# Patient Record
Sex: FEMALE | Race: WHITE | Employment: PART TIME | URBAN - METROPOLITAN AREA
[De-identification: names, ages, dates, MRNs, and addresses within clinical notes are randomized per-mention and may not be internally consistent; named-entity substitution may affect disease eponyms.]

---

## 2018-07-18 PROBLEM — R10.2 PELVIC PAIN: Status: ACTIVE | Noted: 2018-07-18

## 2018-07-18 PROBLEM — R92.2 DENSE BREASTS: Status: ACTIVE | Noted: 2017-11-06

## 2018-07-18 PROBLEM — R19.00 PELVIC MASS: Status: ACTIVE | Noted: 2018-07-18

## 2018-07-18 PROBLEM — R92.30 DENSE BREASTS: Status: ACTIVE | Noted: 2017-11-06

## 2018-07-20 PROBLEM — D21.9 LEIOMYOMA: Status: ACTIVE | Noted: 2018-07-20

## 2018-07-20 PROBLEM — R10.2 PELVIC PAIN: Status: RESOLVED | Noted: 2018-07-18 | Resolved: 2018-07-20

## 2018-08-07 PROCEDURE — 88305 TISSUE EXAM BY PATHOLOGIST: CPT | Performed by: OBSTETRICS & GYNECOLOGY

## 2018-08-16 PROCEDURE — 83625 ASSAY OF LDH ENZYMES: CPT | Performed by: OBSTETRICS & GYNECOLOGY

## 2018-09-14 PROBLEM — Z90.710 S/P LAPAROSCOPIC HYSTERECTOMY: Status: ACTIVE | Noted: 2018-09-14

## 2018-09-14 PROBLEM — D21.9 LEIOMYOMA: Status: RESOLVED | Noted: 2018-07-20 | Resolved: 2018-09-14

## 2018-09-14 PROBLEM — R19.00 PELVIC MASS: Status: RESOLVED | Noted: 2018-07-18 | Resolved: 2018-09-14

## 2018-12-12 PROCEDURE — 88304 TISSUE EXAM BY PATHOLOGIST: CPT | Performed by: OBSTETRICS & GYNECOLOGY

## 2021-02-02 ENCOUNTER — OFFICE VISIT (OUTPATIENT)
Dept: FAMILY MEDICINE CLINIC | Facility: CLINIC | Age: 56
End: 2021-02-02
Payer: COMMERCIAL

## 2021-02-02 VITALS
BODY MASS INDEX: 24.46 KG/M2 | TEMPERATURE: 98 F | WEIGHT: 152.19 LBS | DIASTOLIC BLOOD PRESSURE: 70 MMHG | RESPIRATION RATE: 16 BRPM | HEIGHT: 66 IN | HEART RATE: 68 BPM | SYSTOLIC BLOOD PRESSURE: 116 MMHG

## 2021-02-02 DIAGNOSIS — G43.709 CHRONIC MIGRAINE WITHOUT AURA WITHOUT STATUS MIGRAINOSUS, NOT INTRACTABLE: ICD-10-CM

## 2021-02-02 DIAGNOSIS — Z12.31 VISIT FOR SCREENING MAMMOGRAM: ICD-10-CM

## 2021-02-02 DIAGNOSIS — Z00.00 ROUTINE GENERAL MEDICAL EXAMINATION AT A HEALTH CARE FACILITY: Primary | ICD-10-CM

## 2021-02-02 PROCEDURE — 3008F BODY MASS INDEX DOCD: CPT | Performed by: FAMILY MEDICINE

## 2021-02-02 PROCEDURE — 3078F DIAST BP <80 MM HG: CPT | Performed by: FAMILY MEDICINE

## 2021-02-02 PROCEDURE — 3074F SYST BP LT 130 MM HG: CPT | Performed by: FAMILY MEDICINE

## 2021-02-02 PROCEDURE — 99386 PREV VISIT NEW AGE 40-64: CPT | Performed by: FAMILY MEDICINE

## 2021-02-02 RX ORDER — SUMATRIPTAN 100 MG/1
TABLET, FILM COATED ORAL
Qty: 27 TABLET | Refills: 3 | Status: SHIPPED | OUTPATIENT
Start: 2021-02-02

## 2021-02-02 NOTE — PROGRESS NOTES
HPI:   Afsaneh Holder is a 63year old female who presents for a complete physical exam.  Last pap:  S/p hyst age 46, preserved ovaries  Last mammogram:  due   Previous colonoscopy:  Age 53/51 in 2015  Previous DEXA:  /.  Current or previous treatment:  /  F Date   • HYSTERECTOMY     •       x2      Family History   Problem Relation Age of Onset   • Breast Cancer Mother 80   • High Blood Pressure Mother    • Cancer Mother         pancreatic   • Cancer Father         esophageal    • High Cholesterol Father Imitrex refilled.    Orders Placed This Encounter      Lipid Panel      Comp Metabolic Panel (14)      CBC, Platelet, No Differential [E]      TSH W Reflex To Free T4      Vitamin D, 25-Hydroxy      Meds & Refills for this Visit:  Requested Prescriptions

## 2021-03-02 ENCOUNTER — LAB ENCOUNTER (OUTPATIENT)
Dept: LAB | Age: 56
End: 2021-03-02
Attending: FAMILY MEDICINE
Payer: COMMERCIAL

## 2021-03-02 DIAGNOSIS — Z00.00 ROUTINE GENERAL MEDICAL EXAMINATION AT A HEALTH CARE FACILITY: ICD-10-CM

## 2021-03-02 LAB
ALBUMIN SERPL-MCNC: 3.8 G/DL (ref 3.4–5)
ALBUMIN/GLOB SERPL: 1.3 {RATIO} (ref 1–2)
ALP LIVER SERPL-CCNC: 35 U/L
ALT SERPL-CCNC: 38 U/L
ANION GAP SERPL CALC-SCNC: 5 MMOL/L (ref 0–18)
AST SERPL-CCNC: 17 U/L (ref 15–37)
BILIRUB SERPL-MCNC: 0.6 MG/DL (ref 0.1–2)
BUN BLD-MCNC: 16 MG/DL (ref 7–18)
BUN/CREAT SERPL: 16.3 (ref 10–20)
CALCIUM BLD-MCNC: 9.1 MG/DL (ref 8.5–10.1)
CHLORIDE SERPL-SCNC: 108 MMOL/L (ref 98–112)
CHOLEST SMN-MCNC: 261 MG/DL (ref ?–200)
CO2 SERPL-SCNC: 29 MMOL/L (ref 21–32)
CREAT BLD-MCNC: 0.98 MG/DL
DEPRECATED RDW RBC AUTO: 45.1 FL (ref 35.1–46.3)
ERYTHROCYTE [DISTWIDTH] IN BLOOD BY AUTOMATED COUNT: 13.4 % (ref 11–15)
GLOBULIN PLAS-MCNC: 3 G/DL (ref 2.8–4.4)
GLUCOSE BLD-MCNC: 91 MG/DL (ref 70–99)
HCT VFR BLD AUTO: 36.6 %
HDLC SERPL-MCNC: 89 MG/DL (ref 40–59)
HGB BLD-MCNC: 11.9 G/DL
LDLC SERPL CALC-MCNC: 155 MG/DL (ref ?–100)
M PROTEIN MFR SERPL ELPH: 6.8 G/DL (ref 6.4–8.2)
MCH RBC QN AUTO: 29.7 PG (ref 26–34)
MCHC RBC AUTO-ENTMCNC: 32.5 G/DL (ref 31–37)
MCV RBC AUTO: 91.3 FL
NONHDLC SERPL-MCNC: 172 MG/DL (ref ?–130)
OSMOLALITY SERPL CALC.SUM OF ELEC: 295 MOSM/KG (ref 275–295)
PATIENT FASTING Y/N/NP: YES
PATIENT FASTING Y/N/NP: YES
PLATELET # BLD AUTO: 165 10(3)UL (ref 150–450)
POTASSIUM SERPL-SCNC: 4.1 MMOL/L (ref 3.5–5.1)
RBC # BLD AUTO: 4.01 X10(6)UL
SODIUM SERPL-SCNC: 142 MMOL/L (ref 136–145)
TRIGL SERPL-MCNC: 83 MG/DL (ref 30–149)
TSI SER-ACNC: 2.86 MIU/ML (ref 0.36–3.74)
VIT D+METAB SERPL-MCNC: 70.5 NG/ML (ref 30–100)
VLDLC SERPL CALC-MCNC: 17 MG/DL (ref 0–30)
WBC # BLD AUTO: 4.4 X10(3) UL (ref 4–11)

## 2021-03-02 PROCEDURE — 84443 ASSAY THYROID STIM HORMONE: CPT

## 2021-03-02 PROCEDURE — 85027 COMPLETE CBC AUTOMATED: CPT

## 2021-03-02 PROCEDURE — 80053 COMPREHEN METABOLIC PANEL: CPT

## 2021-03-02 PROCEDURE — 82306 VITAMIN D 25 HYDROXY: CPT

## 2021-03-02 PROCEDURE — 80061 LIPID PANEL: CPT

## 2021-03-02 PROCEDURE — 36415 COLL VENOUS BLD VENIPUNCTURE: CPT

## 2021-03-05 ENCOUNTER — TELEPHONE (OUTPATIENT)
Dept: FAMILY MEDICINE CLINIC | Facility: CLINIC | Age: 56
End: 2021-03-05

## 2021-03-05 DIAGNOSIS — D64.9 ANEMIA, UNSPECIFIED TYPE: Primary | ICD-10-CM

## 2021-03-16 ENCOUNTER — HOSPITAL ENCOUNTER (OUTPATIENT)
Dept: MAMMOGRAPHY | Facility: HOSPITAL | Age: 56
Discharge: HOME | End: 2021-03-16
Attending: FAMILY MEDICINE
Payer: COMMERCIAL

## 2021-03-16 DIAGNOSIS — Z12.31 VISIT FOR SCREENING MAMMOGRAM: ICD-10-CM

## 2021-03-16 PROCEDURE — 77067 SCR MAMMO BI INCL CAD: CPT | Performed by: FAMILY MEDICINE

## 2021-03-16 PROCEDURE — 77063 BREAST TOMOSYNTHESIS BI: CPT | Performed by: FAMILY MEDICINE

## 2021-03-24 ENCOUNTER — PATIENT MESSAGE (OUTPATIENT)
Dept: FAMILY MEDICINE CLINIC | Facility: CLINIC | Age: 56
End: 2021-03-24

## 2021-03-24 NOTE — TELEPHONE ENCOUNTER
From: Toma Baxter  To: Raven Martins MD  Sent: 3/24/2021 12:54 PM CDT  Subject: Non-Urgent Medical Question    I was directed by your Customer Service group to let you know that I have already received my Covid Vaccines.     I received two doses of Pfize

## 2021-04-14 ENCOUNTER — HOSPITAL ENCOUNTER (OUTPATIENT)
Dept: MAMMOGRAPHY | Facility: HOSPITAL | Age: 56
Discharge: HOME | End: 2021-04-14
Attending: FAMILY MEDICINE
Payer: COMMERCIAL

## 2021-04-14 ENCOUNTER — TELEPHONE (OUTPATIENT)
Dept: FAMILY MEDICINE CLINIC | Facility: CLINIC | Age: 56
End: 2021-04-14

## 2021-04-14 DIAGNOSIS — R92.2 DENSE BREASTS: ICD-10-CM

## 2021-04-14 PROCEDURE — 76641 ULTRASOUND BREAST COMPLETE: CPT | Performed by: FAMILY MEDICINE

## 2021-04-14 NOTE — TELEPHONE ENCOUNTER
calling in to ask if the following Dx code can be added to the current US Breast Bilateral order that is currently in place. Dx code 12.39.  is requesting this for billing purposes. Does not want it billed as dx procedure.   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

## 2021-06-15 ENCOUNTER — LAB ENCOUNTER (OUTPATIENT)
Dept: LAB | Age: 56
End: 2021-06-15
Attending: FAMILY MEDICINE
Payer: COMMERCIAL

## 2021-06-16 ENCOUNTER — LAB ENCOUNTER (OUTPATIENT)
Dept: LAB | Age: 56
End: 2021-06-16
Attending: FAMILY MEDICINE
Payer: COMMERCIAL

## 2021-06-16 DIAGNOSIS — D64.9 ANEMIA, UNSPECIFIED TYPE: ICD-10-CM

## 2021-06-16 DIAGNOSIS — E78.00 PURE HYPERCHOLESTEROLEMIA: ICD-10-CM

## 2021-06-16 PROCEDURE — 36415 COLL VENOUS BLD VENIPUNCTURE: CPT

## 2021-06-16 PROCEDURE — 83540 ASSAY OF IRON: CPT

## 2021-06-16 PROCEDURE — 85025 COMPLETE CBC W/AUTO DIFF WBC: CPT

## 2021-06-16 PROCEDURE — 82607 VITAMIN B-12: CPT

## 2021-06-16 PROCEDURE — 80061 LIPID PANEL: CPT

## 2021-06-16 PROCEDURE — 83550 IRON BINDING TEST: CPT

## 2021-06-16 PROCEDURE — 82728 ASSAY OF FERRITIN: CPT

## 2021-06-16 PROCEDURE — 82746 ASSAY OF FOLIC ACID SERUM: CPT

## 2021-06-23 ENCOUNTER — TELEPHONE (OUTPATIENT)
Dept: FAMILY MEDICINE CLINIC | Facility: CLINIC | Age: 56
End: 2021-06-23

## 2021-06-23 NOTE — TELEPHONE ENCOUNTER
_______________________________________________________  Referred to Provider Information:  Provider Address Phone   Kina Stewart, [address] [place] [address] Dr Abram Philip [phone]     [facility]

## 2022-01-31 ENCOUNTER — TELEPHONE (OUTPATIENT)
Dept: FAMILY MEDICINE CLINIC | Facility: CLINIC | Age: 57
End: 2022-01-31

## 2022-01-31 DIAGNOSIS — Z00.00 ROUTINE GENERAL MEDICAL EXAMINATION AT A HEALTH CARE FACILITY: Primary | ICD-10-CM

## 2022-01-31 NOTE — TELEPHONE ENCOUNTER
Please enter lab orders for the patient's upcoming physical appointment. Physical scheduled:    Your appointments     Date & Time Appointment Department [facility])    Mar 21, 2022 12:00 PM CDT Adult Physical with Delia Zuluaga  [facility],

## 2022-04-05 ENCOUNTER — LAB ENCOUNTER (OUTPATIENT)
Dept: LAB | Age: 57
End: 2022-04-05
Attending: FAMILY MEDICINE
Payer: COMMERCIAL

## 2022-04-05 DIAGNOSIS — Z00.00 ROUTINE GENERAL MEDICAL EXAMINATION AT A HEALTH CARE FACILITY: ICD-10-CM

## 2022-04-05 LAB
ALBUMIN SERPL-MCNC: 3.7 G/DL (ref 3.4–5)
ALBUMIN/GLOB SERPL: 1.2 {RATIO} (ref 1–2)
ALP LIVER SERPL-CCNC: 36 U/L
ALT SERPL-CCNC: 34 U/L
ANION GAP SERPL CALC-SCNC: 4 MMOL/L (ref 0–18)
AST SERPL-CCNC: 23 U/L (ref 15–37)
BASOPHILS # BLD AUTO: 0.05 X10(3) UL (ref 0–0.2)
BASOPHILS NFR BLD AUTO: 1.1 %
BILIRUB SERPL-MCNC: 0.6 MG/DL (ref 0.1–2)
BUN BLD-MCNC: 14 MG/DL (ref 7–18)
CALCIUM BLD-MCNC: 9 MG/DL (ref 8.5–10.1)
CHLORIDE SERPL-SCNC: 109 MMOL/L (ref 98–112)
CHOLEST SERPL-MCNC: 246 MG/DL (ref ?–200)
CO2 SERPL-SCNC: 28 MMOL/L (ref 21–32)
CREAT BLD-MCNC: 0.96 MG/DL
DEPRECATED HBV CORE AB SER IA-ACNC: 22.7 NG/ML
EOSINOPHIL # BLD AUTO: 0.14 X10(3) UL (ref 0–0.7)
EOSINOPHIL NFR BLD AUTO: 3 %
ERYTHROCYTE [DISTWIDTH] IN BLOOD BY AUTOMATED COUNT: 13.2 %
FASTING PATIENT LIPID ANSWER: YES
FASTING STATUS PATIENT QL REPORTED: YES
GLOBULIN PLAS-MCNC: 3.1 G/DL (ref 2.8–4.4)
GLUCOSE BLD-MCNC: 96 MG/DL (ref 70–99)
HCT VFR BLD AUTO: 36.7 %
HDLC SERPL-MCNC: 87 MG/DL (ref 40–59)
HGB BLD-MCNC: 11.9 G/DL
IMM GRANULOCYTES # BLD AUTO: 0 X10(3) UL (ref 0–1)
IMM GRANULOCYTES NFR BLD: 0 %
LDLC SERPL CALC-MCNC: 146 MG/DL (ref ?–100)
LYMPHOCYTES # BLD AUTO: 2.35 X10(3) UL (ref 1–4)
LYMPHOCYTES NFR BLD AUTO: 50.5 %
MCH RBC QN AUTO: 29.6 PG (ref 26–34)
MCHC RBC AUTO-ENTMCNC: 32.4 G/DL (ref 31–37)
MCV RBC AUTO: 91.3 FL
MONOCYTES # BLD AUTO: 0.37 X10(3) UL (ref 0.1–1)
MONOCYTES NFR BLD AUTO: 8 %
NEUTROPHILS # BLD AUTO: 1.74 X10 (3) UL (ref 1.5–7.7)
NEUTROPHILS # BLD AUTO: 1.74 X10(3) UL (ref 1.5–7.7)
NEUTROPHILS NFR BLD AUTO: 37.4 %
NONHDLC SERPL-MCNC: 159 MG/DL (ref ?–130)
OSMOLALITY SERPL CALC.SUM OF ELEC: 292 MOSM/KG (ref 275–295)
PLATELET # BLD AUTO: 175 10(3)UL (ref 150–450)
POTASSIUM SERPL-SCNC: 4.2 MMOL/L (ref 3.5–5.1)
PROT SERPL-MCNC: 6.8 G/DL (ref 6.4–8.2)
RBC # BLD AUTO: 4.02 X10(6)UL
SODIUM SERPL-SCNC: 141 MMOL/L (ref 136–145)
TRIGL SERPL-MCNC: 80 MG/DL (ref 30–149)
TSI SER-ACNC: 2.71 MIU/ML (ref 0.36–3.74)
VIT D+METAB SERPL-MCNC: 79.2 NG/ML (ref 30–100)
VLDLC SERPL CALC-MCNC: 15 MG/DL (ref 0–30)
WBC # BLD AUTO: 4.7 X10(3) UL (ref 4–11)

## 2022-04-05 PROCEDURE — 82728 ASSAY OF FERRITIN: CPT

## 2022-04-05 PROCEDURE — 85025 COMPLETE CBC W/AUTO DIFF WBC: CPT

## 2022-04-05 PROCEDURE — 80053 COMPREHEN METABOLIC PANEL: CPT

## 2022-04-05 PROCEDURE — 80061 LIPID PANEL: CPT

## 2022-04-05 PROCEDURE — 84443 ASSAY THYROID STIM HORMONE: CPT

## 2022-04-05 PROCEDURE — 82306 VITAMIN D 25 HYDROXY: CPT

## 2022-04-05 PROCEDURE — 36415 COLL VENOUS BLD VENIPUNCTURE: CPT

## 2022-04-19 ENCOUNTER — PATIENT MESSAGE (OUTPATIENT)
Dept: FAMILY MEDICINE CLINIC | Facility: CLINIC | Age: 57
End: 2022-04-19

## 2022-04-19 ENCOUNTER — TELEPHONE (OUTPATIENT)
Dept: FAMILY MEDICINE CLINIC | Facility: CLINIC | Age: 57
End: 2022-04-19

## 2022-04-19 ENCOUNTER — OFFICE VISIT (OUTPATIENT)
Dept: FAMILY MEDICINE CLINIC | Facility: CLINIC | Age: 57
End: 2022-04-19
Payer: COMMERCIAL

## 2022-04-19 VITALS
BODY MASS INDEX: 24.59 KG/M2 | RESPIRATION RATE: 16 BRPM | WEIGHT: 153 LBS | TEMPERATURE: 98 F | HEART RATE: 74 BPM | SYSTOLIC BLOOD PRESSURE: 112 MMHG | HEIGHT: 66 IN | DIASTOLIC BLOOD PRESSURE: 70 MMHG

## 2022-04-19 DIAGNOSIS — E78.00 PURE HYPERCHOLESTEROLEMIA: ICD-10-CM

## 2022-04-19 DIAGNOSIS — Z00.00 ROUTINE GENERAL MEDICAL EXAMINATION AT A HEALTH CARE FACILITY: Primary | ICD-10-CM

## 2022-04-19 DIAGNOSIS — G43.709 CHRONIC MIGRAINE WITHOUT AURA WITHOUT STATUS MIGRAINOSUS, NOT INTRACTABLE: ICD-10-CM

## 2022-04-19 DIAGNOSIS — D64.9 ANEMIA, UNSPECIFIED TYPE: ICD-10-CM

## 2022-04-19 PROCEDURE — 3078F DIAST BP <80 MM HG: CPT | Performed by: FAMILY MEDICINE

## 2022-04-19 PROCEDURE — 99396 PREV VISIT EST AGE 40-64: CPT | Performed by: FAMILY MEDICINE

## 2022-04-19 PROCEDURE — 3008F BODY MASS INDEX DOCD: CPT | Performed by: FAMILY MEDICINE

## 2022-04-19 PROCEDURE — 3074F SYST BP LT 130 MM HG: CPT | Performed by: FAMILY MEDICINE

## 2022-04-19 RX ORDER — SUMATRIPTAN 100 MG/1
100 TABLET, FILM COATED ORAL EVERY 2 HOUR PRN
COMMUNITY

## 2022-04-19 RX ORDER — SUMATRIPTAN 100 MG/1
TABLET, FILM COATED ORAL
Qty: 27 TABLET | Refills: 3 | Status: SHIPPED | OUTPATIENT
Start: 2022-04-19

## 2022-04-19 NOTE — PATIENT INSTRUCTIONS
A heart scan, a CT scan of the heart, is the safest and most accurate screening tool for detecting the early build-up of calcium in the coronary arteries, the most common cause of heart disease. This simple, painless and potentially lifesaving test takes just 15 minutes.     To schedule call [phone]    Heart scan locations:  [facility] [address] [place] [address] (enter through the Emergency Dept.)    Make an appointment for a heart scan if you are male over age 36 or a female over age 39, and have one or more of these risk factors:  High blood pressure  High cholesterol  Smoking  Obesity  Diabetes  Family history of heart disease

## 2022-04-19 NOTE — TELEPHONE ENCOUNTER
LM for patient to come in and sign her physical form that Dr. Alessio Flynn filled out today.  Form in front drawer

## 2022-04-19 NOTE — TELEPHONE ENCOUNTER
From: Lio Garcia  To: Mario Chamberlain MD  Sent: 4/19/2022 4:31 PM CDT  Subject: Barbee Mallick Dr. Yvonne Schirmer. I forgot to ask for you to request a mammogram for me during our visit. (Too many other referrals today! ! ). Could you please put this order in for me? (Last year I ended up doing an ultrasound too since I have dense breasts, but I not sure if that is recommended again this year or not).      Thank you!! Lio Garcia

## 2022-04-26 ENCOUNTER — HOSPITAL ENCOUNTER (OUTPATIENT)
Dept: MAMMOGRAPHY | Facility: HOSPITAL | Age: 57
Discharge: HOME | End: 2022-04-26
Attending: FAMILY MEDICINE
Payer: COMMERCIAL

## 2022-04-26 DIAGNOSIS — Z12.31 SCREENING MAMMOGRAM FOR BREAST CANCER: ICD-10-CM

## 2022-04-26 PROCEDURE — 77063 BREAST TOMOSYNTHESIS BI: CPT | Performed by: FAMILY MEDICINE

## 2022-04-26 PROCEDURE — 77067 SCR MAMMO BI INCL CAD: CPT | Performed by: FAMILY MEDICINE

## 2022-05-21 ENCOUNTER — MOBILE ENCOUNTER (OUTPATIENT)
Dept: OBGYN CLINIC | Facility: CLINIC | Age: 57
End: 2022-05-21

## 2022-05-21 RX ORDER — ALPRAZOLAM 0.25 MG/1
0.25 TABLET ORAL NIGHTLY PRN
Refills: 0 | Status: CANCELLED | OUTPATIENT
Start: 2022-05-21

## 2022-05-22 ENCOUNTER — TELEPHONE (OUTPATIENT)
Dept: INTERNAL MEDICINE CLINIC | Facility: CLINIC | Age: 57
End: 2022-05-22

## 2022-05-22 NOTE — TELEPHONE ENCOUNTER
Pt called 5/21/22    3d ago had a bad family event and since then has had off on anxiety s/s. Never had this before. 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    rec call on Monday

## 2022-05-23 ENCOUNTER — TELEPHONE (OUTPATIENT)
Dept: FAMILY MEDICINE CLINIC | Facility: CLINIC | Age: 57
End: 2022-05-23

## 2022-05-23 NOTE — TELEPHONE ENCOUNTER
She is having some Anxiety issues crying really upset she cant sleep forcing herself to eat racing heart rate. Shaking she is scared. Would like to know if she can get a call back and discuss My first available is 06/13 with walker  she does not want to wait that long and would prefer to see Dr vines .

## 2022-05-23 NOTE — TELEPHONE ENCOUNTER
Spoke with . Patient is currently on her way to [facility]. Their son recently got in trouble and it has set off patient's anxiety. No current treatment. But has been having panic attacks the past 4 days.  questions if they will prescribe meds today. Discussed that I cannot confirm this. If patient is in need of medications today should be seen at [facility] for evaluation later.  did schedule appointment this week for patient to discuss medication. Date and time confirmed. Patient's  verbalized understanding of information given.

## 2022-05-24 NOTE — ED INITIAL ASSESSMENT (HPI)
Pt presents to ed ambulatory with steady gait c/o anxiety.  States symptoms worsened Thursday, denies hx of anxiety, states she has not been able to sleep

## 2022-06-10 NOTE — PROGRESS NOTES
Subjective     HPI:   Ajit Mendez is a 61year old female. Reason for video visit:  Anxiety, sleep  This visit is conducted using Telemedicine with live, interactive video and audio. Son was arrested at college. This caused increase anxiety. Did not do well with zoloft. Had worse anxiety with this. Had similar reaction to venlafaxine in the past.     Using lorazepam at night to help her sleep. Has tried to sleep without it. Last night was 3rd night without. Last night did not sleep at all. Started getting very anxiety, shaking. So took lorazepam this am to stop the anxiety. She used xanax which helped her sleep, but then she woke up in a panic and was shaking. Chief Complaint Reviewed and Verified  Nursing Notes Reviewed and   Verified  Tobacco Reviewed  Allergies Reviewed  Medications Reviewed    OB Status Reviewed            REVIEW OF SYSTEMS:  See HPI           Physical Exam:  Physical Exam:  Gen:  Alert  Resp:  Speaking in full sentences comfortably  Psych:  Does not appear anxious or depressed        Assessment    1. Primary insomnia    2. Situational anxiety    Overall seems to be improving. Plan to continue lorazepam as needed for anxiety/sleep. Brannon Dela Cruz MD    The following visit was completed using two-way, real-time interactive audio and/or video communication. This has been done in good varinder to provide continuity of care in the best interest of the provider-patient relationship, due to the ongoing public health crisis/national emergency and because of restrictions of visitation. There are limitations of this visit, but every conscious effort was taken to allow for sufficient and adequate time. This billing was spent on reviewing labs, medications, radiology tests and decision making. Appropriate medical decision-making and tests are ordered as detailed in the plan of care above.

## 2022-07-12 ENCOUNTER — HOSPITAL ENCOUNTER (OUTPATIENT)
Dept: CT IMAGING | Age: 57
Discharge: HOME | End: 2022-07-12
Attending: FAMILY MEDICINE

## 2022-07-12 DIAGNOSIS — Z13.6 SCREENING FOR HEART DISEASE: ICD-10-CM

## 2022-07-12 NOTE — PROGRESS NOTES
Date of Service 7/12/2022    Sailaja Pruitt  Date of Birth 6/25/1965    Patient Age: 61year old    PCP: Nila Domingo MD  [address] [address] [address] [id number]    Consult Type  Type Scan/Screening: Heart Scan  Preliminary Heart Scan Score: 0      112/70  No medication. Body Mass Index  There is no height or weight on file to calculate BMI. Lipid Profile  Cholesterol: 246, done on 4/5/2022. HDL Cholesterol: 87, done on 4/5/2022. LDL Cholesterol: 146, done on 4/5/2022. TriGlycerides 80, done on 4/5/2022. LDL goal <100  Decrease saturated fats, as we discussed your high LDL is probably genetic. Nurse Review  Risk factor information and results reviewed with Nurse: Yes     Father had Bypass in his late 63's. Recommended Follow Up:  Consult your physician regarding[de-identified] Final Heart Scan Report; Discuss potential for Incidental Finding    No data recorded      Recommendations for Change:     Cholesterol Modification (goal of therapy depends upon your risk): Decrease LDL (Lousy/Bad) Ideal <100     Smoking Cessation: No Change Needed  Weight Management: Maintain Current Weight     Repeat Heart Scan: 5 years if Calcium Score is 0. 0; Discuss with your Physician          Lillie Recommended Resources:  Recommended Resources: Upcoming Classes, Medical Services and Health Library www. [email or web address]. Jeffry Porter RN        Please Contact the Nurse Heart Line with any Questions or Concerns [phone].

## 2022-08-22 ENCOUNTER — LAB ENCOUNTER (OUTPATIENT)
Dept: LAB | Age: 57
End: 2022-08-22
Attending: INTERNAL MEDICINE
Payer: COMMERCIAL

## 2022-08-22 DIAGNOSIS — D50.8 OTHER IRON DEFICIENCY ANEMIA: ICD-10-CM

## 2022-08-22 LAB — IGA SERPL-MCNC: 138 MG/DL (ref 70–312)

## 2022-08-22 PROCEDURE — 86364 TISS TRNSGLTMNASE EA IG CLAS: CPT

## 2022-08-22 PROCEDURE — 36415 COLL VENOUS BLD VENIPUNCTURE: CPT

## 2022-08-22 PROCEDURE — 86258 DGP ANTIBODY EACH IG CLASS: CPT

## 2022-08-22 PROCEDURE — 86256 FLUORESCENT ANTIBODY TITER: CPT

## 2022-08-22 PROCEDURE — 82784 ASSAY IGA/IGD/IGG/IGM EACH: CPT

## 2022-08-22 PROCEDURE — 81382 HLA II TYPING 1 LOC HR: CPT

## 2022-08-23 ENCOUNTER — OFFICE VISIT (OUTPATIENT)
Dept: NEUROLOGY | Facility: CLINIC | Age: 57
End: 2022-08-23
Payer: COMMERCIAL

## 2022-08-23 VITALS
SYSTOLIC BLOOD PRESSURE: 121 MMHG | DIASTOLIC BLOOD PRESSURE: 70 MMHG | WEIGHT: 148 LBS | HEIGHT: 66 IN | RESPIRATION RATE: 16 BRPM | BODY MASS INDEX: 23.78 KG/M2 | HEART RATE: 65 BPM

## 2022-08-23 DIAGNOSIS — G43.709 CHRONIC MIGRAINE W/O AURA W/O STATUS MIGRAINOSUS, NOT INTRACTABLE: Primary | ICD-10-CM

## 2022-08-23 DIAGNOSIS — G24.3 CERVICAL DYSTONIA: ICD-10-CM

## 2022-08-23 LAB
GLIADIN IGA SER-ACNC: 0.3 U/ML (ref ?–7)
GLIADIN IGG SER-ACNC: <0.6 U/ML (ref ?–7)
TTG IGA SER-ACNC: 0.2 U/ML (ref ?–7)

## 2022-08-23 PROCEDURE — 3078F DIAST BP <80 MM HG: CPT | Performed by: OTHER

## 2022-08-23 PROCEDURE — 3008F BODY MASS INDEX DOCD: CPT | Performed by: OTHER

## 2022-08-23 PROCEDURE — 3074F SYST BP LT 130 MM HG: CPT | Performed by: OTHER

## 2022-08-23 PROCEDURE — 99243 OFF/OP CNSLTJ NEW/EST LOW 30: CPT | Performed by: OTHER

## 2022-08-23 RX ORDER — ONDANSETRON 4 MG/1
4 TABLET, ORALLY DISINTEGRATING ORAL EVERY 8 HOURS PRN
COMMUNITY
End: 2022-08-23

## 2022-08-23 RX ORDER — TRAMADOL HYDROCHLORIDE 50 MG/1
50 TABLET ORAL EVERY 8 HOURS PRN
COMMUNITY
End: 2022-08-23

## 2022-08-23 RX ORDER — ONDANSETRON 4 MG/1
4 TABLET, ORALLY DISINTEGRATING ORAL EVERY 8 HOURS PRN
Qty: 30 TABLET | Refills: 5 | Status: SHIPPED | OUTPATIENT
Start: 2022-08-23

## 2022-08-23 RX ORDER — TRAMADOL HYDROCHLORIDE 50 MG/1
50 TABLET ORAL EVERY 8 HOURS PRN
Qty: 60 TABLET | Refills: 0 | Status: SHIPPED | OUTPATIENT
Start: 2022-08-23

## 2022-08-24 ENCOUNTER — TELEPHONE (OUTPATIENT)
Dept: SURGERY | Facility: CLINIC | Age: 57
End: 2022-08-24

## 2022-08-24 DIAGNOSIS — G43.709 CHRONIC MIGRAINE WITHOUT AURA WITHOUT STATUS MIGRAINOSUS, NOT INTRACTABLE: Primary | ICD-10-CM

## 2022-08-24 NOTE — TELEPHONE ENCOUNTER
NICOLLE Other order placed for BOTOX for MIGRAINE with completed questionnaire.     Routed to PA team.

## 2022-08-24 NOTE — TELEPHONE ENCOUNTER
Please place a referral    Lise Bazan MD sent to [address] [place] [address],[address] Nurse; Elder JUDD for BOTOX for HEADACHE and also for her neck dystionia

## 2022-09-07 NOTE — TELEPHONE ENCOUNTER
Priority is BOTOX for Migraine. If approved will attempt CERVICAL DYSTONIA PA in one month. OV scheduled 12/9/2022.

## 2022-09-07 NOTE — TELEPHONE ENCOUNTER
Per Dr Kj Venegas, no need for separate PA for cervical dystonia. Remaining BOTOX after migraine can be used for cervical dystonia.

## 2022-09-07 NOTE — TELEPHONE ENCOUNTER
Per Dr Goyo Vásquez' note below, he is requesting Botox for both Migraine and Cervical Dystonia. Botox referral indicates Migraine only and the Cervical Dystonia referral was closed. Please advise of correct Dx and Units needed. Thank you.

## 2022-09-07 NOTE — TELEPHONE ENCOUNTER
Just to clarify, Dr Ifeoma Bonds will only be able to use 66197 administration code when billing for Botox injection. Only one administration code can be billed per Botox treatment visit.   Insurance requires administration code on PA/Pre-D request.

## 2022-09-22 NOTE — TELEPHONE ENCOUNTER
Dr. Adiel Carbajal agreeable to adding patient next week at 8am on either Tues 9/27 or Wed 9/28 while on call. Patient already has f/u appt scheduled for 12/9/22. Left detailed message on confidential voice mail (OK per Verbal Release) relaying above. Explained that if she is not able to come in next week we will have to use her 12/9/22 appointment for botox since his schedule is full through the end of the year. Requested she c/b office to schedule appointment.

## 2022-09-22 NOTE — TELEPHONE ENCOUNTER
Received faxed authorization from [facility] from [facility]    This is [employer]    Prior authorization request completed for: Botox 200U  Authorization # [id number]     Authorization dates: 22 - 23   CPT codes approved: , 60403  Number of visits/dates of service approved: 4  Physician: Mayo Rivera    Once 200 units of Botox are available, please call the patient for first treatment. Please notify the PA team of the date. Thank you.     If provider still wants to complete Botox PA for cervical dystonia, please place that referral.  Thank you

## 2022-09-23 NOTE — TELEPHONE ENCOUNTER
Patient calling, advised she received botox elsewhere recently, changing follow up appointment on 12/9/22 to an initial botox appointment.

## 2022-12-09 ENCOUNTER — OFFICE VISIT (OUTPATIENT)
Dept: NEUROLOGY | Facility: CLINIC | Age: 57
End: 2022-12-09
Payer: COMMERCIAL

## 2022-12-09 VITALS
HEART RATE: 78 BPM | RESPIRATION RATE: 16 BRPM | DIASTOLIC BLOOD PRESSURE: 62 MMHG | HEIGHT: 66 IN | BODY MASS INDEX: 23.78 KG/M2 | WEIGHT: 148 LBS | SYSTOLIC BLOOD PRESSURE: 122 MMHG

## 2022-12-09 DIAGNOSIS — G43.709 CHRONIC MIGRAINE W/O AURA W/O STATUS MIGRAINOSUS, NOT INTRACTABLE: Primary | ICD-10-CM

## 2022-12-09 NOTE — PROGRESS NOTES
Paperwork noting that patient may bear financial responsibility for procedure(s) performed in clinic today signed prior to proceeding with procedure(s). Furthermore, patient notified that they should contact their insurer to verify that your procedure/test has been approved and is a COVERED benefit. Although the [facility] staff does its due diligence, the insurance carrier gives the disclaimer that \"Although the procedure is authorized, this does not guarantee payment. \"    Ultimately the patient is responsible for payment.     Botox is:  [x] Buy and Bill  [] Patient Supplied

## 2022-12-09 NOTE — PROGRESS NOTES
Paperwork noting that patient may bear financial responsibility for procedure(s) performed in clinic today signed prior to proceeding with procedure(s). Furthermore, patient notified that they should contact their insurer to verify that your procedure/test has been approved and is a COVERED benefit. Although the [facility] staff does its due diligence, the insurance carrier gives the disclaimer that \"Although the procedure is authorized, this does not guarantee payment. \"    Ultimately the patient is responsible for payment.

## 2023-01-23 ENCOUNTER — TELEPHONE (OUTPATIENT)
Dept: FAMILY MEDICINE CLINIC | Facility: CLINIC | Age: 58
End: 2023-01-23

## 2023-01-23 DIAGNOSIS — Z00.00 ROUTINE GENERAL MEDICAL EXAMINATION AT A HEALTH CARE FACILITY: Primary | ICD-10-CM

## 2023-01-23 NOTE — TELEPHONE ENCOUNTER
Please enter lab orders for the patient's upcoming physical appointment. Physical scheduled: Your appointments     Date & Time Appointment Department [facility])    Apr 25, 2023  1:00 PM CDT Physical - Established with MD Len Allen, [address], [place] ([address])            Len Sanon, [address], [place] [facility] [address] [facility] [phone]         Preferred lab: [address] [facility])     The patient has been notified to complete fasting labs prior to their physical appointment.

## 2023-02-23 PROBLEM — Z12.11 SPECIAL SCREENING FOR MALIGNANT NEOPLASM OF COLON: Status: ACTIVE | Noted: 2023-02-23

## 2023-02-23 PROBLEM — D12.0 BENIGN NEOPLASM OF CECUM: Status: ACTIVE | Noted: 2023-02-23

## 2023-02-23 PROBLEM — D12.3 BENIGN NEOPLASM OF TRANSVERSE COLON: Status: ACTIVE | Noted: 2023-02-23

## 2023-03-10 ENCOUNTER — OFFICE VISIT (OUTPATIENT)
Dept: NEUROLOGY | Facility: CLINIC | Age: 58
End: 2023-03-10
Payer: COMMERCIAL

## 2023-03-10 ENCOUNTER — NURSE ONLY (OUTPATIENT)
Dept: LAB | Age: 58
End: 2023-03-10
Attending: INTERNAL MEDICINE
Payer: COMMERCIAL

## 2023-03-10 VITALS
HEIGHT: 66 IN | HEART RATE: 69 BPM | RESPIRATION RATE: 16 BRPM | WEIGHT: 148 LBS | DIASTOLIC BLOOD PRESSURE: 59 MMHG | SYSTOLIC BLOOD PRESSURE: 127 MMHG | BODY MASS INDEX: 23.78 KG/M2

## 2023-03-10 DIAGNOSIS — G43.709 CHRONIC MIGRAINE W/O AURA W/O STATUS MIGRAINOSUS, NOT INTRACTABLE: Primary | ICD-10-CM

## 2023-03-10 DIAGNOSIS — D50.8 OTHER IRON DEFICIENCY ANEMIA: ICD-10-CM

## 2023-03-10 DIAGNOSIS — D50.9 IRON DEFICIENCY ANEMIA, UNSPECIFIED IRON DEFICIENCY ANEMIA TYPE: ICD-10-CM

## 2023-03-10 PROCEDURE — 83013 H PYLORI (C-13) BREATH: CPT

## 2023-03-10 NOTE — PROGRESS NOTES
Paperwork noting that patient may bear financial responsibility for procedure(s) performed in clinic today signed prior to proceeding with procedure(s). Furthermore, patient notified that they should contact their insurer to verify that your procedure/test has been approved and is a COVERED benefit. Although the [facility] staff does its due diligence, the insurance carrier gives the disclaimer that \"Although the procedure is authorized, this does not guarantee payment. \"    Ultimately the patient is responsible for payment. Botox is:  [x] Buy and Bill  [] Patient Supplied      Botox Reauthorization Questions:  1. Has the patient experienced a reduction in frequency of migraines since starting Botox? yes  a. If yes, by what percentage? 50%  2. Has the intensity of migraines decreased since starting Botox? yes  a. If yes, by what percentage?  50%

## 2023-03-11 LAB — H. PYLORI BREATH TEST: NEGATIVE

## 2023-03-28 ENCOUNTER — TELEPHONE (OUTPATIENT)
Dept: NEUROLOGY | Facility: CLINIC | Age: 58
End: 2023-03-28

## 2023-04-17 ENCOUNTER — LAB ENCOUNTER (OUTPATIENT)
Dept: LAB | Age: 58
End: 2023-04-17
Attending: FAMILY MEDICINE
Payer: COMMERCIAL

## 2023-04-17 DIAGNOSIS — Z00.00 ROUTINE GENERAL MEDICAL EXAMINATION AT A HEALTH CARE FACILITY: ICD-10-CM

## 2023-04-17 LAB
ALBUMIN SERPL-MCNC: 3.9 G/DL (ref 3.4–5)
ALBUMIN/GLOB SERPL: 1.3 {RATIO} (ref 1–2)
ALP LIVER SERPL-CCNC: 36 U/L
ALT SERPL-CCNC: 28 U/L
ANION GAP SERPL CALC-SCNC: 3 MMOL/L (ref 0–18)
AST SERPL-CCNC: 20 U/L (ref 15–37)
BILIRUB SERPL-MCNC: 0.8 MG/DL (ref 0.1–2)
BUN BLD-MCNC: 9 MG/DL (ref 7–18)
CALCIUM BLD-MCNC: 8.9 MG/DL (ref 8.5–10.1)
CHLORIDE SERPL-SCNC: 108 MMOL/L (ref 98–112)
CHOLEST SERPL-MCNC: 240 MG/DL (ref ?–200)
CO2 SERPL-SCNC: 29 MMOL/L (ref 21–32)
CREAT BLD-MCNC: 0.9 MG/DL
DEPRECATED HBV CORE AB SER IA-ACNC: 35 NG/ML
ERYTHROCYTE [DISTWIDTH] IN BLOOD BY AUTOMATED COUNT: 12.7 %
FASTING PATIENT LIPID ANSWER: YES
FASTING STATUS PATIENT QL REPORTED: YES
GFR SERPLBLD BASED ON 1.73 SQ M-ARVRAT: 74 ML/MIN/1.73M2 (ref 60–?)
GLOBULIN PLAS-MCNC: 2.9 G/DL (ref 2.8–4.4)
GLUCOSE BLD-MCNC: 97 MG/DL (ref 70–99)
HCT VFR BLD AUTO: 38.8 %
HDLC SERPL-MCNC: 87 MG/DL (ref 40–59)
HGB BLD-MCNC: 13 G/DL
LDLC SERPL CALC-MCNC: 140 MG/DL (ref ?–100)
MCH RBC QN AUTO: 30.2 PG (ref 26–34)
MCHC RBC AUTO-ENTMCNC: 33.5 G/DL (ref 31–37)
MCV RBC AUTO: 90 FL
NONHDLC SERPL-MCNC: 153 MG/DL (ref ?–130)
OSMOLALITY SERPL CALC.SUM OF ELEC: 289 MOSM/KG (ref 275–295)
PLATELET # BLD AUTO: 173 10(3)UL (ref 150–450)
POTASSIUM SERPL-SCNC: 3.7 MMOL/L (ref 3.5–5.1)
PROT SERPL-MCNC: 6.8 G/DL (ref 6.4–8.2)
RBC # BLD AUTO: 4.31 X10(6)UL
SODIUM SERPL-SCNC: 140 MMOL/L (ref 136–145)
TRIGL SERPL-MCNC: 79 MG/DL (ref 30–149)
TSI SER-ACNC: 2.62 MIU/ML (ref 0.36–3.74)
VIT D+METAB SERPL-MCNC: 39.3 NG/ML (ref 30–100)
VLDLC SERPL CALC-MCNC: 14 MG/DL (ref 0–30)
WBC # BLD AUTO: 4.4 X10(3) UL (ref 4–11)

## 2023-04-17 PROCEDURE — 82306 VITAMIN D 25 HYDROXY: CPT

## 2023-04-17 PROCEDURE — 80061 LIPID PANEL: CPT

## 2023-04-17 PROCEDURE — 82728 ASSAY OF FERRITIN: CPT

## 2023-04-17 PROCEDURE — 36415 COLL VENOUS BLD VENIPUNCTURE: CPT

## 2023-04-17 PROCEDURE — 85027 COMPLETE CBC AUTOMATED: CPT

## 2023-04-17 PROCEDURE — 84443 ASSAY THYROID STIM HORMONE: CPT

## 2023-04-17 PROCEDURE — 80053 COMPREHEN METABOLIC PANEL: CPT

## 2023-04-25 ENCOUNTER — OFFICE VISIT (OUTPATIENT)
Dept: FAMILY MEDICINE CLINIC | Facility: CLINIC | Age: 58
End: 2023-04-25
Payer: COMMERCIAL

## 2023-04-25 VITALS
BODY MASS INDEX: 24.49 KG/M2 | HEART RATE: 72 BPM | RESPIRATION RATE: 16 BRPM | WEIGHT: 154.19 LBS | TEMPERATURE: 98 F | HEIGHT: 66.5 IN | SYSTOLIC BLOOD PRESSURE: 102 MMHG | DIASTOLIC BLOOD PRESSURE: 70 MMHG

## 2023-04-25 DIAGNOSIS — Z12.31 VISIT FOR SCREENING MAMMOGRAM: ICD-10-CM

## 2023-04-25 DIAGNOSIS — R92.2 DENSE BREAST TISSUE ON MAMMOGRAM: ICD-10-CM

## 2023-04-25 DIAGNOSIS — Z00.00 ROUTINE GENERAL MEDICAL EXAMINATION AT A HEALTH CARE FACILITY: Primary | ICD-10-CM

## 2023-04-25 PROBLEM — Z12.11 SPECIAL SCREENING FOR MALIGNANT NEOPLASM OF COLON: Status: RESOLVED | Noted: 2023-02-23 | Resolved: 2023-04-25

## 2023-04-25 PROCEDURE — 3078F DIAST BP <80 MM HG: CPT | Performed by: FAMILY MEDICINE

## 2023-04-25 PROCEDURE — 3074F SYST BP LT 130 MM HG: CPT | Performed by: FAMILY MEDICINE

## 2023-04-25 PROCEDURE — 99396 PREV VISIT EST AGE 40-64: CPT | Performed by: FAMILY MEDICINE

## 2023-04-25 PROCEDURE — 3008F BODY MASS INDEX DOCD: CPT | Performed by: FAMILY MEDICINE

## 2023-05-02 ENCOUNTER — HOSPITAL ENCOUNTER (OUTPATIENT)
Dept: MAMMOGRAPHY | Facility: HOSPITAL | Age: 58
Discharge: HOME | End: 2023-05-02
Attending: FAMILY MEDICINE
Payer: COMMERCIAL

## 2023-05-02 DIAGNOSIS — Z12.31 VISIT FOR SCREENING MAMMOGRAM: ICD-10-CM

## 2023-05-02 PROCEDURE — 77067 SCR MAMMO BI INCL CAD: CPT | Performed by: FAMILY MEDICINE

## 2023-05-02 PROCEDURE — 77063 BREAST TOMOSYNTHESIS BI: CPT | Performed by: FAMILY MEDICINE

## 2023-06-14 ENCOUNTER — OFFICE VISIT (OUTPATIENT)
Dept: NEUROLOGY | Facility: CLINIC | Age: 58
End: 2023-06-14
Payer: COMMERCIAL

## 2023-06-14 VITALS
DIASTOLIC BLOOD PRESSURE: 60 MMHG | OXYGEN SATURATION: 100 % | WEIGHT: 150 LBS | BODY MASS INDEX: 23.82 KG/M2 | HEIGHT: 66.5 IN | RESPIRATION RATE: 18 BRPM | HEART RATE: 73 BPM | SYSTOLIC BLOOD PRESSURE: 98 MMHG

## 2023-06-14 DIAGNOSIS — G43.709 CHRONIC MIGRAINE W/O AURA W/O STATUS MIGRAINOSUS, NOT INTRACTABLE: Primary | ICD-10-CM

## 2023-08-28 ENCOUNTER — PATIENT MESSAGE (OUTPATIENT)
Dept: FAMILY MEDICINE CLINIC | Facility: CLINIC | Age: 58
End: 2023-08-28

## 2023-08-28 RX ORDER — SUMATRIPTAN 100 MG/1
TABLET, FILM COATED ORAL
Qty: 27 TABLET | Refills: 3 | Status: SHIPPED | OUTPATIENT
Start: 2023-08-28

## 2023-08-28 NOTE — TELEPHONE ENCOUNTER
Refill request for:    Requested Prescriptions     Pending Prescriptions Disp Refills    SUMATRIPTAN SUCCINATE 100 MG Oral Tab [Pharmacy Med Name: SUMATRIPTAN 100MG TABLETS] 27 tablet 3     Sig: TAKE 1 TABLET BY MOUTH AS NEEDED FOR HEADACHE. MAY REPEAT ONCE IN 2 HOURS FOR PERSISTENT HEADACHE. Last Prescribed Quantity Refills   4/19/22 27 3     LOV 4/25/2023     Patient was asked to follow-up in: one year    Appointment due: April 2023    Appointment scheduled: Visit date not found    Medication not on protocol.      # 30 with 3 refills routed to Kaleigh Lares MD for review

## 2023-09-12 ENCOUNTER — TELEPHONE (OUTPATIENT)
Dept: NEUROLOGY | Facility: CLINIC | Age: 58
End: 2023-09-12

## 2023-09-27 ENCOUNTER — OFFICE VISIT (OUTPATIENT)
Dept: NEUROLOGY | Facility: CLINIC | Age: 58
End: 2023-09-27
Payer: COMMERCIAL

## 2023-09-27 ENCOUNTER — TELEPHONE (OUTPATIENT)
Dept: NEUROLOGY | Facility: CLINIC | Age: 58
End: 2023-09-27

## 2023-09-27 VITALS
HEART RATE: 78 BPM | DIASTOLIC BLOOD PRESSURE: 78 MMHG | SYSTOLIC BLOOD PRESSURE: 120 MMHG | RESPIRATION RATE: 16 BRPM | WEIGHT: 150 LBS | BODY MASS INDEX: 23.82 KG/M2 | HEIGHT: 66.5 IN

## 2023-09-27 DIAGNOSIS — G43.709 CHRONIC MIGRAINE W/O AURA W/O STATUS MIGRAINOSUS, NOT INTRACTABLE: Primary | ICD-10-CM

## 2023-09-27 DIAGNOSIS — G24.3 CERVICAL DYSTONIA: ICD-10-CM

## 2023-09-27 PROCEDURE — 64615 CHEMODENERV MUSC MIGRAINE: CPT | Performed by: OTHER

## 2023-09-27 PROCEDURE — 3008F BODY MASS INDEX DOCD: CPT | Performed by: OTHER

## 2023-09-27 PROCEDURE — 3078F DIAST BP <80 MM HG: CPT | Performed by: OTHER

## 2023-09-27 PROCEDURE — 3074F SYST BP LT 130 MM HG: CPT | Performed by: OTHER

## 2023-09-27 RX ORDER — UBROGEPANT 100 MG/1
TABLET ORAL
Qty: 10 TABLET | Refills: 5 | Status: SHIPPED | OUTPATIENT
Start: 2023-09-27 | End: 2024-09-26

## 2023-09-27 NOTE — PROGRESS NOTES
Paperwork noting that patient may bear financial responsibility for procedure(s) performed in clinic today signed prior to proceeding with procedure(s). Furthermore, patient notified that they should contact their insurer to verify that your procedure/test has been approved and is a COVERED benefit. Although the [facility] staff does its due diligence, the insurance carrier gives the disclaimer that \"Although the procedure is authorized, this does not guarantee payment. \"    Ultimately the patient is responsible for payment. Botox is:  [x] Buy and Bill  [] Patient Supplied      Botox Reauthorization Questions:  Has the patient experienced a reduction in frequency of migraines since starting Botox? yes  If yes, by what percentage? 50%  Has the intensity of migraines decreased since starting Botox? yes  If yes, by what percentage?  50%

## 2023-09-28 ENCOUNTER — HOSPITAL ENCOUNTER (OUTPATIENT)
Dept: GENERAL RADIOLOGY | Age: 58
Discharge: HOME | End: 2023-09-28
Attending: Other
Payer: COMMERCIAL

## 2023-09-28 DIAGNOSIS — G24.3 CERVICAL DYSTONIA: ICD-10-CM

## 2023-09-28 PROCEDURE — 72050 X-RAY EXAM NECK SPINE 4/5VWS: CPT | Performed by: OTHER

## 2023-09-28 NOTE — TELEPHONE ENCOUNTER
Received fax from [facility] received for patient use of Veria Harm   Approval granted: 9/28/23-9/28/24        Pt notified

## 2023-11-21 ENCOUNTER — TELEPHONE (OUTPATIENT)
Dept: PHYSICAL THERAPY | Facility: HOSPITAL | Age: 58
End: 2023-11-21

## 2023-11-24 ENCOUNTER — TELEPHONE (OUTPATIENT)
Dept: PHYSICAL THERAPY | Facility: HOSPITAL | Age: 58
End: 2023-11-24

## 2023-11-29 ENCOUNTER — OFFICE VISIT (OUTPATIENT)
Dept: PHYSICAL THERAPY | Age: 58
End: 2023-11-29
Attending: FAMILY MEDICINE
Payer: COMMERCIAL

## 2023-11-29 DIAGNOSIS — M25.519 ACUTE SHOULDER PAIN, UNSPECIFIED LATERALITY: Primary | ICD-10-CM

## 2023-11-29 DIAGNOSIS — M25.529 ELBOW PAIN, UNSPECIFIED LATERALITY: ICD-10-CM

## 2023-11-29 PROCEDURE — 97162 PT EVAL MOD COMPLEX 30 MIN: CPT

## 2023-11-29 PROCEDURE — 97110 THERAPEUTIC EXERCISES: CPT

## 2023-12-01 ENCOUNTER — OFFICE VISIT (OUTPATIENT)
Dept: PHYSICAL THERAPY | Age: 58
End: 2023-12-01
Attending: FAMILY MEDICINE
Payer: COMMERCIAL

## 2023-12-01 PROCEDURE — 97140 MANUAL THERAPY 1/> REGIONS: CPT

## 2023-12-01 PROCEDURE — 97110 THERAPEUTIC EXERCISES: CPT

## 2023-12-04 NOTE — PROGRESS NOTES
Diagnosis:   - Acute shoulder pain, unspecified laterality (M25.519)  - Elbow pain, unspecified laterality (S80.235)          Referring Provider: Ben Mcdowell  Date of Evaluation:    11/29/2023    Precautions:  None Next MD visit:   none scheduled  Date of Surgery: n/a   Insurance Primary/Secondary: [employer] [place] / N/A      # Auth Visits: no auth            Subjective: pt arrives to PT today and states her R elbow pain started from pickleball. She states R shoulder pain started w/ weeding/gardening. She states she has hx of R shoulder tendiniitis in college from swimming. Pain:   3/10 - elbow at worst  1/10 - shoulder tightness       Objective:     PULLED FROM INITIAL EVAL AND PRIOR SESSION(s)  Observation/Posture: elevated upper traps bilaterally  Palpation: multiple tender points in B upper traps, B levator, right lateral epicondyle, right wrist extensors, right biceps tendon; Fatty nodule noted near R bicep tendon (not painful)  Sensation: superficial sensation appears intact grossly      AROM: (* denotes performed with pain)  Shoulder  Elbow   B shoulder ROM WNL except pain in right shoulder with flexion past 90* and IR/ER end range pain B elbow and wrist ROM WNL except pain at end range wrist flexion       Strength/MMT: (* denotes performed with pain)  Shoulder Elbow   L shoulder 5/5 grossly  R shoulder 3+/5 due to pain in right biceps tendon L elbow 5/5 grossly  R elbow 3+/5 grossly due to pain in right lateral       strength 12/1/23 R 40# with pain L 60#no pain    R middle finger extension test: (+) on R      Assessment:   Pt tolerated PT tx well today. Added Graston IASTM to R wrist extensors - moderate grittiness present and light redness occurred indicating impaired soft tissue mobility. Added 6 way wrist for improved forearm strength - mm fasciculations present indicating impaired strength and neuromuscular control. Initiated RTC exercises w/ theraband - no pain reported.          Goals:   (to be met in 8-12 visits)   Pt will report improved ability to sleep without waking due to shoulder and elbow pain   Pt will improve shoulder and elbow flexion AROM to pain free and WNL to be able to reach into overhead cabinets without pain or restriction   Pt will improve shoulder AROM in all directions to pain free and WNL to improve ability to don deodorant, don/doff shirts, and wash hair   Pt will increase shoulder AROM ER to WNL to be able to reach and fasten seatbelt   Pt will increase shoulder AROM IR to WNL to be able to reach in back pocket, tuck in shirt, and turn steering wheel without pain  Pt will improve shoulder and elbow strength throughout to 5/5 to improve function with all competitive sport activities. Pt will demonstrate increased mid/low trap strength to 5/5 to promote improved shoulder mechanics and stabilization with lifting and reaching   Pt will be independent and compliant with comprehensive HEP to maintain progress achieved in PT      Plan: Next visit consider - YTB B ER, YTB B horz abd, tan power web all finger ext   Date: 12/1/2023  TX#: 2/12 Date: 12/5/23                TX#: 3/12 Date:                 TX#: 4/12 Date:                 TX#: 5/12 Date:    Tx#: 6/12   Therex: 15'  Relaxed diaphragmatic breathing in sitting and supine to inhibit muscle tension and break up pain cycle;   sitting posture instruction to inhibit BUE tension;   self STM instruction for tender points;   supine towel roll stretch with towel along spine and then crossways at T4 with BUE overhead stretch for pecs and lats UBE 2'/2' - S9 - L1 - 5'           R wrist   R wrist extensor stretch 4x15\"  R 6 way wrist 1# x20  Red flexbar - u, n, 2 way twist - x20 each         R shoulder   R single RTB - unilat ER/IR x20 each      Manual: 30'  Relaxed diaphragmatic breathing in sitting and supine with manual and verbal cues to inhibit muscle tension and break up pain cycle   Oscillations +stm through RUE for muscle tension inhibition (bing R wrist extensors, RUT, and R biceps tendon) Manual: 10'  - Graston - R wrist extensor       HEP: Relaxed diaphragmatic breathing in sitting and supine to inhibit muscle tension and break up pain cycle; sitting posture instruction to inhibit BUE B upper trap tension; self STM instruction for all tender points, supine towel roll stretch with towel along spine and then crossways at T4 with BUE overhead stretch for pecs and lats  ---------------------------  R wrist extensor stretch 3x20\" BID, R 6 way wrist 1# 1x/day.     Charges: 1 manual, 2 therex       Total Timed Treatment: 41 min  Total Treatment Time: 41 min

## 2023-12-05 ENCOUNTER — OFFICE VISIT (OUTPATIENT)
Dept: PHYSICAL THERAPY | Age: 58
End: 2023-12-05
Attending: FAMILY MEDICINE
Payer: COMMERCIAL

## 2023-12-05 PROCEDURE — 97140 MANUAL THERAPY 1/> REGIONS: CPT

## 2023-12-05 PROCEDURE — 97110 THERAPEUTIC EXERCISES: CPT

## 2023-12-08 ENCOUNTER — OFFICE VISIT (OUTPATIENT)
Dept: PHYSICAL THERAPY | Age: 58
End: 2023-12-08
Attending: FAMILY MEDICINE
Payer: COMMERCIAL

## 2023-12-08 PROCEDURE — 97140 MANUAL THERAPY 1/> REGIONS: CPT

## 2023-12-08 PROCEDURE — 97110 THERAPEUTIC EXERCISES: CPT

## 2023-12-08 NOTE — PROGRESS NOTES
Diagnosis:   - Acute shoulder pain, unspecified laterality (M25.519) from weeding/gardening  - Elbow pain, unspecified laterality (M25.529) from pickleball      Referring Provider: Vera Woodard  Date of Evaluation:    11/29/2023    Precautions:  None  - Hx of R shoulder tendinitiis in college from swimming Next MD visit:   none scheduled  Date of Surgery: n/a   Insurance Primary/Secondary: [employer] [place] P[place] / N/A      # Auth Visits: no auth            Subjective: pt arrives to PT today and states her shoulder and elbow were fine from last session. Pain:   1/10 - elbow at worst  1/10 - shoulder tightness       Objective:     PULLED FROM INITIAL EVAL AND PRIOR SESSION(s)  Observation/Posture: elevated upper traps bilaterally  Palpation: multiple tender points in B upper traps, B levator, right lateral epicondyle, right wrist extensors, right biceps tendon; Fatty nodule noted near R bicep tendon (not painful)  Sensation: superficial sensation appears intact grossly      AROM: (* denotes performed with pain)  Shoulder  Elbow   B shoulder ROM WNL except pain in right shoulder with flexion past 90* and IR/ER end range pain B elbow and wrist ROM WNL except pain at end range wrist flexion       Strength/MMT: (* denotes performed with pain)  Shoulder Elbow   L shoulder 5/5 grossly  R shoulder 3+/5 due to pain in right biceps tendon L elbow 5/5 grossly  R elbow 3+/5 grossly due to pain in right lateral       strength 12/1/23 R 40# with pain L 60#no pain    R middle finger extension test: (+) on R      Assessment:   Pt tolerated PT tx well today. Pt continues to demo mm fasciculations eccentrically w/ wrist extension w/ light dumbbell and flexbar indicating impaired eccentric control and strength. Added powerweb finger extension for improved strength and motor control. Added TB B ext and rows today. Updated HEP to include TB exercises.          Goals:   (to be met in 8-12 visits)   Pt will report improved ability to sleep without waking due to shoulder and elbow pain   Pt will improve shoulder and elbow flexion AROM to pain free and WNL to be able to reach into overhead cabinets without pain or restriction   Pt will improve shoulder AROM in all directions to pain free and WNL to improve ability to don deodorant, don/doff shirts, and wash hair   Pt will increase shoulder AROM ER to WNL to be able to reach and fasten seatbelt   Pt will increase shoulder AROM IR to WNL to be able to reach in back pocket, tuck in shirt, and turn steering wheel without pain  Pt will improve shoulder and elbow strength throughout to 5/5 to improve function with all competitive sport activities. Pt will demonstrate increased mid/low trap strength to 5/5 to promote improved shoulder mechanics and stabilization with lifting and reaching   Pt will be independent and compliant with comprehensive HEP to maintain progress achieved in PT      Plan: Next visit consider - YTB B ER, YTB B horz abd, tan power web all finger ext   Date: 12/1/2023  TX#: 2/12 Date: 12/5/23                TX#: 3/12 Date:   12/8/23              TX#: 4/12 Date:                 TX#: 5/12 Date:    Tx#: 6/12   Therex: 15'  Relaxed diaphragmatic breathing in sitting and supine to inhibit muscle tension and break up pain cycle;   sitting posture instruction to inhibit BUE tension;   self STM instruction for tender points;   supine towel roll stretch with towel along spine and then crossways at T4 with BUE overhead stretch for pecs and lats UBE 2'/2' - S9 - L1 - 5'           R wrist   R wrist extensor stretch 4x15\"  R 6 way wrist 1# x20  Red flexbar - u, n, 2 way twist - x20 each   R wrist   R wrist extensor stretch 4x15\"  R 6 way wrist 1# x30  Red flexbar - u, n, flexion (eccentric ext) twist - x25 each  Tan powerweb - all finger extension 2x10  Green Digigrip - one at a time & all at once - x20 each      R shoulder   R single RTB - unilat ER/IR x20 each R shoulder   RTB B ext x20  RTB rows x20 Manual: 30'  Relaxed diaphragmatic breathing in sitting and supine with manual and verbal cues to inhibit muscle tension and break up pain cycle   Oscillations +stm through RUE for muscle tension inhibition (bing R wrist extensors, RUT, and R biceps tendon) Manual: 10'  - Graston - R wrist extensor  Manual: 15'  - Graston - R wrist extensors     HEP: Relaxed diaphragmatic breathing in sitting and supine to inhibit muscle tension and break up pain cycle; sitting posture instruction to inhibit BUE B upper trap tension; self STM instruction for all tender points, supine towel roll stretch with towel along spine and then crossways at T4 with BUE overhead stretch for pecs and lats  ---------------------------  R wrist extensor stretch 3x20\" BID, R 6 way wrist 1# 1x/day.   TB B ext, TB rows, TB unilat ER/IR    Charges: 1 manual, 2 therex       Total Timed Treatment: 43 min  Total Treatment Time: 43 min

## 2023-12-12 ENCOUNTER — OFFICE VISIT (OUTPATIENT)
Dept: PHYSICAL THERAPY | Age: 58
End: 2023-12-12
Attending: FAMILY MEDICINE
Payer: COMMERCIAL

## 2023-12-12 PROCEDURE — 97110 THERAPEUTIC EXERCISES: CPT

## 2023-12-12 PROCEDURE — 97140 MANUAL THERAPY 1/> REGIONS: CPT

## 2023-12-12 PROCEDURE — 97035 APP MDLTY 1+ULTRASOUND EA 15: CPT

## 2023-12-12 NOTE — PROGRESS NOTES
Diagnosis:   - Acute shoulder pain, unspecified laterality (M25.519) from weeding/gardening  - Elbow pain, unspecified laterality (M25.529) from pickleball      Referring Provider: Kim Tapia  Date of Evaluation:    11/29/2023    Precautions:  None  - Hx of R shoulder tendinitiis in college from swimming Next MD visit:   none scheduled  Date of Surgery: n/a   Insurance Primary/Secondary: [employer] [place] / N/A      # Auth Visits: no auth            Subjective: pt arrives to PT today and states her elbow is not good, states she did a lot of cleaning over the weekend but is otherwise not sure why her elbow is hurting like it used to. She states she was fine after last session however. Pain:   3/10 - elbow   2/10 - shoulder tightness       Objective:     PULLED FROM INITIAL EVAL AND PRIOR SESSION(s)  Observation/Posture: elevated upper traps bilaterally  Palpation: multiple tender points in B upper traps, B levator, right lateral epicondyle, right wrist extensors, right biceps tendon; Fatty nodule noted near R bicep tendon (not painful)  Sensation: superficial sensation appears intact grossly      AROM: (* denotes performed with pain)  Shoulder  Elbow   B shoulder ROM WNL except pain in right shoulder with flexion past 90* and IR/ER end range pain B elbow and wrist ROM WNL except pain at end range wrist flexion       Strength/MMT: (* denotes performed with pain)  Shoulder Elbow   L shoulder 5/5 grossly  R shoulder 3+/5 due to pain in right biceps tendon L elbow 5/5 grossly  R elbow 3+/5 grossly due to pain in right lateral       strength 12/1/23 R 40# with pain L 60# no pain    R middle finger extension test: (+) on R      Assessment:   Pt tolerated PT tx well today. Pt continues to demo (+) R middle finger extension test for lateral R elbow pain. Moderate grittiness present w/ Graston indicating continued soft tissue extensibility impairment.   Added ultrasound w/ anti-inflammatory setting to address continued elbow pain          Goals:   (to be met in 8-12 visits)   Pt will report improved ability to sleep without waking due to shoulder and elbow pain   Pt will improve shoulder and elbow flexion AROM to pain free and WNL to be able to reach into overhead cabinets without pain or restriction   Pt will improve shoulder AROM in all directions to pain free and WNL to improve ability to don deodorant, don/doff shirts, and wash hair   Pt will increase shoulder AROM ER to WNL to be able to reach and fasten seatbelt   Pt will increase shoulder AROM IR to WNL to be able to reach in back pocket, tuck in shirt, and turn steering wheel without pain  Pt will improve shoulder and elbow strength throughout to 5/5 to improve function with all competitive sport activities. Pt will demonstrate increased mid/low trap strength to 5/5 to promote improved shoulder mechanics and stabilization with lifting and reaching   Pt will be independent and compliant with comprehensive HEP to maintain progress achieved in PT      Plan: Next visit consider - DAE BARROW, DAE waldron abd  Date: 12/1/2023  TX#: 2/12 Date: 12/5/23                TX#: 3/12 Date:   12/8/23              TX#: 4/12 Date:  12/12/23               TX#: 5/12 Date:    Tx#: 6/12   Therex: 15'  Relaxed diaphragmatic breathing in sitting and supine to inhibit muscle tension and break up pain cycle;   sitting posture instruction to inhibit BUE tension;   self STM instruction for tender points;   supine towel roll stretch with towel along spine and then crossways at T4 with BUE overhead stretch for pecs and lats UBE 2'/2' - S9 - L1 - 5'      UBE 2'/2' - S9 - L1 - 5'    Ultrasound - 50% DC, 3 mhz, 1.2 w/cm2, 10'     R wrist   R wrist extensor stretch 4x15\"  R 6 way wrist 1# x20  Red flexbar - u, n, 2 way twist - x20 each   R wrist   R wrist extensor stretch 4x15\"  R 6 way wrist 1# x30  Red flexbar - u, n, flexion (eccentric ext) twist - x25 each  Tan powerweb - all finger extension 2x10  Green Digigrip - one at a time & all at once - x20 each R wrist   R wrist extensor stretch 4x15\"  Tan powerweb - all finger extension 2x10     R shoulder   R single RTB - unilat ER/IR x20 each R shoulder   RTB B ext x20  RTB rows x20     Manual: 30'  Relaxed diaphragmatic breathing in sitting and supine with manual and verbal cues to inhibit muscle tension and break up pain cycle   Oscillations +stm through RUE for muscle tension inhibition (bing R wrist extensors, RUT, and R biceps tendon) Manual: 10'  - Graston - R wrist extensor  Manual: 15'  - Graston - R wrist extensors Manual: 20'  - Graston - R wrist extensors    HEP: Relaxed diaphragmatic breathing in sitting and supine to inhibit muscle tension and break up pain cycle; sitting posture instruction to inhibit BUE B upper trap tension; self STM instruction for all tender points, supine towel roll stretch with towel along spine and then crossways at T4 with BUE overhead stretch for pecs and lats  ---------------------------  R wrist extensor stretch 3x20\" BID, R 6 way wrist 1# 1x/day.   TB B ext, TB rows, TB unilat ER/IR    Charges: 1 ultrasound, 1 manual, 1 therex      Total Timed Treatment: 45 min  Total Treatment Time: 45 min

## 2023-12-15 ENCOUNTER — OFFICE VISIT (OUTPATIENT)
Dept: PHYSICAL THERAPY | Age: 58
End: 2023-12-15
Attending: FAMILY MEDICINE
Payer: COMMERCIAL

## 2023-12-15 PROCEDURE — 97140 MANUAL THERAPY 1/> REGIONS: CPT

## 2023-12-15 PROCEDURE — 97110 THERAPEUTIC EXERCISES: CPT

## 2023-12-15 PROCEDURE — 97035 APP MDLTY 1+ULTRASOUND EA 15: CPT

## 2023-12-15 NOTE — PROGRESS NOTES
Diagnosis:   - Acute shoulder pain, unspecified laterality (M25.519) from weeding/gardening  - Elbow pain, unspecified laterality (M25.529) from pickleball      Referring Provider: Vera Woodard  Date of Evaluation:    11/29/2023    Precautions:  None  - Hx of R shoulder tendinitiis in college from swimming Next MD visit:   none scheduled  Date of Surgery: n/a   Insurance Primary/Secondary: [employer] [place] / N/A      # Auth Visits: no auth            Subjective: pt arrives to PT today and states her elbow is still hurting, shoulder is the same. Pain:   3/10 - elbow   1/10 - shoulder tightness       Objective:     PULLED FROM INITIAL EVAL AND PRIOR SESSION(s)  Observation/Posture: elevated upper traps bilaterally  Palpation: multiple tender points in B upper traps, B levator, right lateral epicondyle, right wrist extensors, right biceps tendon; Fatty nodule noted near R bicep tendon (not painful)  Sensation: superficial sensation appears intact grossly      AROM: (* denotes performed with pain)  Shoulder  Elbow   B shoulder ROM WNL except pain in right shoulder with flexion past 90* and IR/ER end range pain B elbow and wrist ROM WNL except pain at end range wrist flexion       Strength/MMT: (* denotes performed with pain)  Shoulder Elbow   L shoulder 5/5 grossly  R shoulder 3+/5 due to pain in right biceps tendon L elbow 5/5 grossly  R elbow 3+/5 grossly due to pain in right lateral       strength 12/1/23 R 40# with pain L 60# no pain    R middle finger extension test: (+) on R    Dry needling consent received on 12/15/23. Reviewed indications, potential side effects, aftercare. Assessment:   Pt tolerated PT tx well today. Added dry needling today - 2 LTRs present in R wrist extensors - consent received on 12/15/23. Reviewed indications, potential side effects, aftercare. Pt continues to demo mm fasciculations in R wrist extensors. Held concentric wrist extensor exercises today d/t pt report of mild pain. Changed settings for ultrasound for chronic conditions. Goals:   (to be met in 8-12 visits)   Pt will report improved ability to sleep without waking due to shoulder and elbow pain   Pt will improve shoulder and elbow flexion AROM to pain free and WNL to be able to reach into overhead cabinets without pain or restriction   Pt will improve shoulder AROM in all directions to pain free and WNL to improve ability to don deodorant, don/doff shirts, and wash hair   Pt will increase shoulder AROM ER to WNL to be able to reach and fasten seatbelt   Pt will increase shoulder AROM IR to WNL to be able to reach in back pocket, tuck in shirt, and turn steering wheel without pain  Pt will improve shoulder and elbow strength throughout to 5/5 to improve function with all competitive sport activities.    Pt will demonstrate increased mid/low trap strength to 5/5 to promote improved shoulder mechanics and stabilization with lifting and reaching   Pt will be independent and compliant with comprehensive HEP to maintain progress achieved in PT      Plan: Next visit consider - DAE BARROW, DAE waldron abd  Date: 12/1/2023  TX#: 2/12 Date: 12/5/23                TX#: 3/12 Date:   12/8/23              TX#: 4/12 Date:  12/12/23               TX#: 5/12 Date: 12/15/23  Tx#: 6/12   Therex: 15'  Relaxed diaphragmatic breathing in sitting and supine to inhibit muscle tension and break up pain cycle;   sitting posture instruction to inhibit BUE tension;   self STM instruction for tender points;   supine towel roll stretch with towel along spine and then crossways at T4 with BUE overhead stretch for pecs and lats UBE 2'/2' - S9 - L1 - 5'      UBE 2'/2' - S9 - L1 - 5'    Ultrasound - 50% DC, 3 mhz, 1.2 w/cm2, 10'     Ultrasound - 100% DC, 3 mhz, 0.8 w/cm2, 10'    R wrist   R wrist extensor stretch 4x15\"  R 6 way wrist 1# x20  Red flexbar - u, n, 2 way twist - x20 each   R wrist   R wrist extensor stretch 4x15\"  R 6 way wrist 1# x30  Red flexbar - u, n, flexion (eccentric ext) twist - x25 each  Tan powerweb - all finger extension 2x10  Green Digigrip - one at a time & all at once - x20 each R wrist   R wrist extensor stretch 4x15\"  Tan powerweb - all finger extension 2x10 R wrist  R 5 way wrist 1# x20  R wrist extensor stretch 3x15\"  Red flexbar - n, flexion (eccentric ext) twist - x20 each    R shoulder   R single RTB - unilat ER/IR x20 each R shoulder   RTB B ext x20  RTB rows x20     Manual: 30'  Relaxed diaphragmatic breathing in sitting and supine with manual and verbal cues to inhibit muscle tension and break up pain cycle   Oscillations +stm through RUE for muscle tension inhibition (bing R wrist extensors, RUT, and R biceps tendon) Manual: 10'  - Graston - R wrist extensor  Manual: 15'  - Graston - R wrist extensors Manual: 20'  - Graston - R wrist extensors Manual: 15'  - Dry needling: .[id number] - R wrist extensors - x2  - STM - R wrist extensors   HEP: Relaxed diaphragmatic breathing in sitting and supine to inhibit muscle tension and break up pain cycle; sitting posture instruction to inhibit BUE B upper trap tension; self STM instruction for all tender points, supine towel roll stretch with towel along spine and then crossways at T4 with BUE overhead stretch for pecs and lats  ---------------------------  R wrist extensor stretch 3x20\" BID, R 6 way wrist 1# 1x/day.   TB B ext, TB rows, TB unilat ER/IR    Charges: 1 ultrasound, 1 manual, 1 therex      Total Timed Treatment: 43 min  Total Treatment Time: 43 min

## 2023-12-22 ENCOUNTER — OFFICE VISIT (OUTPATIENT)
Dept: PHYSICAL THERAPY | Age: 58
End: 2023-12-22
Attending: FAMILY MEDICINE
Payer: COMMERCIAL

## 2023-12-22 PROCEDURE — 97035 APP MDLTY 1+ULTRASOUND EA 15: CPT

## 2023-12-22 PROCEDURE — 97110 THERAPEUTIC EXERCISES: CPT

## 2023-12-22 PROCEDURE — 97140 MANUAL THERAPY 1/> REGIONS: CPT

## 2023-12-22 NOTE — PROGRESS NOTES
Diagnosis:   - Acute shoulder pain, unspecified laterality (M25.519) from weeding/gardening  - Elbow pain, unspecified laterality (M25.529) from pickleball      Referring Provider: Ben Mcdowell  Date of Evaluation:    11/29/2023    Precautions:  None  - Hx of R shoulder tendinitiis in college from swimming Next MD visit:   none scheduled  Date of Surgery: n/a   Insurance Primary/Secondary: [employer] [place] PPO / N/A      # Auth Visits: no auth            Subjective: pt arrives to PT today and states she continues to feel arm tightness. She states she still gets lateral elbow pain. She states her arm was achy after last session where DN was done. Pain:   3/10 - elbow   1/10 - shoulder tightness       Objective:     PULLED FROM INITIAL EVAL AND PRIOR SESSION(s)  Observation/Posture: elevated upper traps bilaterally  Palpation: multiple tender points in B upper traps, B levator, right lateral epicondyle, right wrist extensors, right biceps tendon; Fatty nodule noted near R bicep tendon (not painful)  Sensation: superficial sensation appears intact grossly      AROM: (* denotes performed with pain)  Shoulder  Elbow   B shoulder ROM WNL except pain in right shoulder with flexion past 90* and IR/ER end range pain B elbow and wrist ROM WNL except pain at end range wrist flexion       Strength/MMT: (* denotes performed with pain)  Shoulder Elbow   L shoulder 5/5 grossly  R shoulder 3+/5 due to pain in right biceps tendon L elbow 5/5 grossly  R elbow 3+/5 grossly due to pain in right lateral       strength 12/1/23 R 40# with pain L 60# no pain    R middle finger extension test: (+) on R    Dry needling consent received on 12/15/23. Reviewed indications, potential side effects, aftercare. Assessment:   Pt tolerated PT tx well today. Performed deep tissue massage to R shoulder mm and R wrist extensors. Followed with ultrasound. Pt cont to be TTP in R wrist extensors, infraspinatus, LHB, R upper trap w/ MFTP present. Goals:   (to be met in 8-12 visits)   Pt will report improved ability to sleep without waking due to shoulder and elbow pain   Pt will improve shoulder and elbow flexion AROM to pain free and WNL to be able to reach into overhead cabinets without pain or restriction   Pt will improve shoulder AROM in all directions to pain free and WNL to improve ability to don deodorant, don/doff shirts, and wash hair   Pt will increase shoulder AROM ER to WNL to be able to reach and fasten seatbelt   Pt will increase shoulder AROM IR to WNL to be able to reach in back pocket, tuck in shirt, and turn steering wheel without pain  Pt will improve shoulder and elbow strength throughout to 5/5 to improve function with all competitive sport activities.    Pt will demonstrate increased mid/low trap strength to 5/5 to promote improved shoulder mechanics and stabilization with lifting and reaching   Pt will be independent and compliant with comprehensive HEP to maintain progress achieved in PT      Plan: Next visit consider - DAE BARROW, DAE waldron abd  Date: 12/5/23                TX#: 3/12 Date:   12/8/23              TX#: 4/12 Date:  12/12/23               TX#: 5/12 Date: 12/15/23  Tx#: 6/12 Date: 12/22/23  Tx#: 7/12   UBE 2'/2' - S9 - L1 - 5'      UBE 2'/2' - S9 - L1 - 5'    Ultrasound - 50% DC, 3 mhz, 1.2 w/cm2, 10'     Ultrasound - 100% DC, 3 mhz, 0.8 w/cm2, 10' UBE 2'/2' - S9 - L3   Ultrasound - 100% DC, 3 mhz, 0.8 w/cm2, 8'   R wrist   R wrist extensor stretch 4x15\"  R 6 way wrist 1# x20  Red flexbar - u, n, 2 way twist - x20 each   R wrist   R wrist extensor stretch 4x15\"  R 6 way wrist 1# x30  Red flexbar - u, n, flexion (eccentric ext) twist - x25 each  Tan powerweb - all finger extension 2x10  Green Digigrip - one at a time & all at once - x20 each R wrist   R wrist extensor stretch 4x15\"  Tan powerweb - all finger extension 2x10 R wrist  R 5 way wrist 1# x20  R wrist extensor stretch 3x15\"  Red flexbar - n, flexion (eccentric ext) twist - x20 each R wrist  R 5 way wrist 1# x20  R wrist ext 1# 2x10 in gravity lessened position   R shoulder   R single RTB - unilat ER/IR x20 each R shoulder   RTB B ext x20  RTB rows x20          Manual: 10'  - Graston - R wrist extensor  Manual: 15'  - Graston - R wrist extensors Manual: 20'  - Graston - R wrist extensors Manual: 15'  - Dry needling: .[id number] - R wrist extensors - x2  - STM - R wrist extensors Manual: 25'  - STM - R wrist extensors  STM - R shoulder (infraspinatus, teres min, upper trap, LHB)   HEP: Relaxed diaphragmatic breathing in sitting and supine to inhibit muscle tension and break up pain cycle; sitting posture instruction to inhibit BUE B upper trap tension; self STM instruction for all tender points, supine towel roll stretch with towel along spine and then crossways at T4 with BUE overhead stretch for pecs and lats  ---------------------------  R wrist extensor stretch 3x20\" BID, R 6 way wrist 1# 1x/day.   TB B ext, TB rows, TB unilat ER/IR    Charges: 1 ultrasound, 1 manual, 1 therex      Total Timed Treatment: 43 min  Total Treatment Time: 43 min

## 2023-12-29 ENCOUNTER — OFFICE VISIT (OUTPATIENT)
Dept: PHYSICAL THERAPY | Age: 58
End: 2023-12-29
Attending: FAMILY MEDICINE
Payer: COMMERCIAL

## 2023-12-29 ENCOUNTER — OFFICE VISIT (OUTPATIENT)
Dept: NEUROLOGY | Facility: CLINIC | Age: 58
End: 2023-12-29
Payer: COMMERCIAL

## 2023-12-29 VITALS
DIASTOLIC BLOOD PRESSURE: 68 MMHG | HEART RATE: 69 BPM | BODY MASS INDEX: 23.82 KG/M2 | SYSTOLIC BLOOD PRESSURE: 104 MMHG | RESPIRATION RATE: 16 BRPM | HEIGHT: 66.5 IN | WEIGHT: 150 LBS

## 2023-12-29 DIAGNOSIS — G43.709 CHRONIC MIGRAINE W/O AURA W/O STATUS MIGRAINOSUS, NOT INTRACTABLE: Primary | ICD-10-CM

## 2023-12-29 PROCEDURE — 3074F SYST BP LT 130 MM HG: CPT | Performed by: OTHER

## 2023-12-29 PROCEDURE — 3078F DIAST BP <80 MM HG: CPT | Performed by: OTHER

## 2023-12-29 PROCEDURE — 97110 THERAPEUTIC EXERCISES: CPT

## 2023-12-29 PROCEDURE — 97140 MANUAL THERAPY 1/> REGIONS: CPT

## 2023-12-29 PROCEDURE — 3008F BODY MASS INDEX DOCD: CPT | Performed by: OTHER

## 2023-12-29 PROCEDURE — 64615 CHEMODENERV MUSC MIGRAINE: CPT | Performed by: OTHER

## 2023-12-29 NOTE — PROGRESS NOTES
Diagnosis:   - Acute shoulder pain, unspecified laterality (M25.519) from weeding/gardening  - Elbow pain, unspecified laterality (M25.529) from pickleball      Referring Provider: Gal Gee  Date of Evaluation:    11/29/2023    Precautions:  None  - Hx of R shoulder tendinitiis in college from swimming Next MD visit:   none scheduled  Date of Surgery: n/a   Insurance Primary/Secondary: [employer] [place] / N/A      # Auth Visits: no auth            Subjective: pt arrives to PT today and states the STM to shoulder seemed to help her shoulder pain but her elbow seems worse w/ home exercises. Pain:   2/10 - elbow   1/10 - shoulder tightness       Objective:     Mm fasciculations present w/ posterior shoulder exercises in infraspinatus and posterior delt    Initial eval objectives:  Observation/Posture: elevated upper traps bilaterally  Palpation: multiple tender points in B upper traps, B levator, right lateral epicondyle, right wrist extensors, right biceps tendon; Fatty nodule noted near R bicep tendon (not painful)  Sensation: superficial sensation appears intact grossly      AROM: (* denotes performed with pain)  Shoulder  Elbow   B shoulder ROM WNL except pain in right shoulder with flexion past 90* and IR/ER end range pain B elbow and wrist ROM WNL except pain at end range wrist flexion       Strength/MMT: (* denotes performed with pain)  Shoulder Elbow   L shoulder 5/5 grossly  R shoulder 3+/5 due to pain in right biceps tendon L elbow 5/5 grossly  R elbow 3+/5 grossly due to pain in right lateral       strength 12/1/23 R 40# with pain L 60# no pain    R middle finger extension test: (+) on R    Dry needling consent received on 12/15/23. Reviewed indications, potential side effects, aftercare. Assessment:   Pt tolerated PT tx well today. Advised pt to consider OT for R elbow pain however plan to continue PT for R shoulder pain. Progressed R shoulder exercises as tolerated. Updated HEP.   Mm fasciculations present w/ posterior shoulder exercises in infraspinatus and posterior delt indicating impaired strength and neuromuscular control. Goals:   (to be met in 8-12 visits)   Pt will report improved ability to sleep without waking due to shoulder and elbow pain   Pt will improve shoulder and elbow flexion AROM to pain free and WNL to be able to reach into overhead cabinets without pain or restriction   Pt will improve shoulder AROM in all directions to pain free and WNL to improve ability to don deodorant, don/doff shirts, and wash hair   Pt will increase shoulder AROM ER to WNL to be able to reach and fasten seatbelt   Pt will increase shoulder AROM IR to WNL to be able to reach in back pocket, tuck in shirt, and turn steering wheel without pain  Pt will improve shoulder and elbow strength throughout to 5/5 to improve function with all competitive sport activities. Pt will demonstrate increased mid/low trap strength to 5/5 to promote improved shoulder mechanics and stabilization with lifting and reaching   Pt will be independent and compliant with comprehensive HEP to maintain progress achieved in PT      Plan: Holding PT at this time per patient request.  Pt states she will plan to call to resume PT in 2024 once her insurance is updated. Patient to follow up with physician for OT referral for lateral elbow pain.    Date: 12/5/23                TX#: 3/12 Date:   12/8/23              TX#: 4/12 Date:  12/12/23               TX#: 5/12 Date: 12/15/23  Tx#: 6/12 Date: 12/22/23  Tx#: 7/12 Date: 12/29/23  Tx#: 8/12   UBE 2'/2' - S9 - L1 - 5'      UBE 2'/2' - S9 - L1 - 5'    Ultrasound - 50% DC, 3 mhz, 1.2 w/cm2, 10'     Ultrasound - 100% DC, 3 mhz, 0.8 w/cm2, 10' UBE 2'/2' - S9 - L3   Ultrasound - 100% DC, 3 mhz, 0.8 w/cm2, 8' UBE 2'/2' - S9 - L3   R wrist extensor stretch 4x10\"  R prone: I's, T's, rows x20 0#  RTB ER/IR x20 each way  HEP generation and review   R wrist   R wrist extensor stretch 4x15\"  R 6 way wrist 1# x20  Red flexbar - u, n, 2 way twist - x20 each   R wrist   R wrist extensor stretch 4x15\"  R 6 way wrist 1# x30  Red flexbar - u, n, flexion (eccentric ext) twist - x25 each  Tan powerweb - all finger extension 2x10  Green Digigrip - one at a time & all at once - x20 each R wrist   R wrist extensor stretch 4x15\"  Tan powerweb - all finger extension 2x10 R wrist  R 5 way wrist 1# x20  R wrist extensor stretch 3x15\"  Red flexbar - n, flexion (eccentric ext) twist - x20 each R wrist  R 5 way wrist 1# x20  R wrist ext 1# 2x10 in gravity lessened position    R shoulder   R single RTB - unilat ER/IR x20 each R shoulder   RTB B ext x20  RTB rows x20           Manual: 10'  - Graston - R wrist extensor  Manual: 15'  - Graston - R wrist extensors Manual: 20'  - Graston - R wrist extensors Manual: 15'  - Dry needling: .[id number] - R wrist extensors - x2  - STM - R wrist extensors Manual: 25'  - STM - R wrist extensors  STM - R shoulder (infraspinatus, teres min, upper trap, LHB) Manual: 21'  STM - R shoulder (infraspinatus, teres min, upper trap, LHB)   HEP:   1x/day 3x10: R prone - I, T, row 0-1#; TB B ext, TB row, TB unilat ER/IR    Charges: 2 manual, 1 therex    Total Timed Treatment: 41 min  Total Treatment Time: 41 min

## 2023-12-29 NOTE — PROGRESS NOTES
Paperwork noting that patient may bear financial responsibility for procedure(s) performed in clinic today signed prior to proceeding with procedure(s). Furthermore, patient notified that they should contact their insurer to verify that your procedure/test has been approved and is a COVERED benefit. Although the [facility] staff does its due diligence, the insurance carrier gives the disclaimer that \"Although the procedure is authorized, this does not guarantee payment. \"    Ultimately the patient is responsible for payment. Botox is:  [x] Buy and Bill  [] Patient Supplied      Botox Reauthorization Questions:  Has the patient experienced a reduction in frequency of migraines since starting Botox? no  If yes, by what percentage? N/A  Has the intensity of migraines decreased since starting Botox? yes  If yes, by what percentage?  80%

## 2024-01-05 ENCOUNTER — APPOINTMENT (OUTPATIENT)
Dept: PHYSICAL THERAPY | Age: 59
End: 2024-01-05
Attending: FAMILY MEDICINE

## 2024-01-08 ENCOUNTER — PATIENT MESSAGE (OUTPATIENT)
Dept: FAMILY MEDICINE CLINIC | Facility: CLINIC | Age: 59
End: 2024-01-08

## 2024-01-08 DIAGNOSIS — M25.519 ARTHRALGIA OF SHOULDER, UNSPECIFIED LATERALITY: ICD-10-CM

## 2024-01-08 DIAGNOSIS — M25.529 ARTHRALGIA OF UPPER ARM, UNSPECIFIED LATERALITY: Primary | ICD-10-CM

## 2024-01-09 NOTE — TELEPHONE ENCOUNTER
From: Viviana Monzon  To: Sowmya Cosby  Sent: 1/8/2024 1:07 PM CST  Subject: OT referral    Lia Cosby and Happy New Year!  I have been seeing a Physical Therapist in December for some shoulder pain and some tennis elbow. The tennis elbow is getting worse even though I have cut out all related activity. My PT, Michael, thinks I should see the OT at their office, but this requires a referral. Would you be able to write a referral for OT?   Thank you ,   Viviana Monzon

## 2024-01-09 NOTE — TELEPHONE ENCOUNTER
Dr. Cosby, please see note from patient requesting OT.  You have not seen patient since April of 2023. We have no documentation to support OT.   Since tennis elbow is worse should patient see ortho?

## 2024-01-23 ENCOUNTER — TELEPHONE (OUTPATIENT)
Dept: PHYSICAL THERAPY | Facility: HOSPITAL | Age: 59
End: 2024-01-23

## 2024-01-25 NOTE — PROGRESS NOTES
OCCUPATIONAL THERAPY UPPER EXTREMITY EVALUATION   Referring Provider: Dr. Aleman  Diagnosis: right lateral elbow pain, tendinopathy        Orders:     Order Date:  1/10/2024  Authorizing Provider:   LORENA ALEMAN     Procedure:  OP REFERRAL TO [facility] [[id number]]         Order #:   [id number]  Qty:  1     Priority:  Routine                   Class:   IHP - RFL     Standing Interval:          Standing Occurrences:          Expires on:            Expected by:    Associated DX:  Arthralgia of upper arm, unspecified laterality (M25.529)  Arthralgia of shoulder, unspecified laterality (M25.519)     Order summary:  IHP - RFL, Routine, 8 visits  Occupational  Therapy Area of Concentration: Orthopedic  Occupational Therapy Ortho: UE  If the patient can be seen sooner with a PT vs an OT, can we cancel this order and replace with a PT order? No         Comments:  Assessment:   Pt tolerated PT tx well today.  Advised pt to consider OT for R elbow pain however plan to continue PT for R shoulder pain.  Progressed R shoulder exercises as tolerated.  Updated HEP.  Mm fasciculations present w/ posterior shoulder exercises in infraspinatus and posterior delt indicating impaired strength and neuromuscular control.    SUBJECTIVE:  Viviana Monzon is a 58 year old female who presents to therapy today with complaints of right lateral elbow pain.  Pt describes pain level current 1/10, at best 1/10, at worst 3/10.  (8 with exam)  Current functional limitations include reaching for water bottle, playing pickle ball, working on computer; snow blowing/shoveling.    Viviana describes prior level of function independent/no limitations.  Employment: Working full duty on comuter  Hand Dominance: right  Living Situation: w/ spouse  HPI: started in October of this past year while playing pickleball; had PT but felt symptoms worsened.  Imaging/Tests: none  Pt goals include relief of pain.  Past medical history was reviewed with  Viviana. Significant findings: shoulder impingement?  Past Medical History:   Diagnosis Date    Anemia     Always seem to be low iron    Anxiety     Recent Anxiety attack- May 2022    Arthritis 2022    Bloating     occasionally- when I eat dairy    Constipation     Occasionally when I eat dairy or take Iron supplements    Easy bruising     Food intolerance     I avoid gluten and dairy- more regular bowel movements .    Headache disorder 1994    History of Migraines    Heavy menses     Had Fibroids. Hysterectomy 2018    High cholesterol     Migraines     Night sweats     Wears glasses 1980    Weight gain 2021    Gained 5-10 lbs           Precautions: None    OBJECTIVE:    OBSERVATION: Unremarkable       OUTCOME MEASURE   (Initial Eval):  QuickDASH Outcome Score  Score: 38.64 % (1/29/2024  2:53 PM)      ORTHOTICS: has counterforce strap; will bring in.        Hand Strength: (measured in pounds)   Date 1/30/24 Date 1/30/24   Position Right  Left     in standard position 55 63    in stress loaded position 45 8/10 pain 65   3 point pinch 13 13   lateral pinch 17 16     Sensation: Date 1/30/24  Description of sensation: normal, but reports occasional ulnar-sided hand numbness/tingling       Provocative Tests: Date 1/30/24  (+) 7-8/10 pain with long finger resisted extension  (-) 0/10 pain with resisted wrist extension  (+) 4/10 pain with palpation to lateral epicondyle  (-) 0/10 pain with palpation to radial tunnel  (-) 0/10 pain with palpation to lateral supracondylar ridge  (-) 0/10 pain with resistance to Supinator  (-) 0/10 Pain with resistance to Anconeus      Posture: slightly rounded shoulders      NECK SCREEN: reports some 'Stiffness\"          Occupational profile: history and experiences, patterns of daily living, interests, values and needs:    Patient's expressed concerns about performing occupations and daily life on initial eval:     Occupational roles affected by referring diagnosis on initial eval:    Student: N/a    Homemaker:  limited   Spouse:  limited   Worker:  limited   Leisure: Unable to perform  (pickleball)   Cook/: limited   /transporter: limited       ASSESSMENT  Viviana presents to occupational therapy evaluation with primary c/o right lateral elbow pain. The results of the objective tests and measures show the physical, cognitive and/or psychosocial skills affected in the summary below, including specifically of note decreased force tolerance at lateral elbow with .  Functional deficits include but are not limited to gripping and lifting water bottle, chopping food, computer use.  Signs and symptoms are consistent with diagnosis of lateral elbow tendinopathy. Patient and OT discussed evaluation findings, pathology, POC and HEP.  Patient voiced understanding and performs HEP correctly without reported pain. Skilled Occupational Therapy is medically necessary to address the above impairments and reach functional goals.     Occupations as identified above (representing ADL's and IADL's, Education, Work, Leisure, and Social Participation) and the following component areas:    ---Physical skills affected by referring diagnosis: Pain, (holds elbow slightly flexed) Decreased range of motion, Decreased strength, Decreased endurance in the right arm.    ---Cognitive skills affected by referring diagnosis: None    ---Psychosocial skills affected by referring diagnosis: Interpersonal interactions, Habits, Routines, Use of coping strategies.    The Occupational Therapy [place] 11730 Low Complex was selected based on the followin. Chart Review: A brief chart review indicating low complexity was performed.  Occupational profile: the following were assessed: presenting problems, reasons for referral, occupational history, patterns of daily living, interests, values, needs, client's goals/concerns about performing occupations and daily life skills.  Medical and therapy  history review: PLF identified, review of medical records.    2. Assessment of Occupational Performance: (see above summary of performance deficits identified; levels of assessment used) moderate complexity (3-5 performance deficits relating to physical, cognitive, or psychosocial skills).    3. Clinical decision-making: includes the assessment process, comorbidities (additional diseases/conditions/disorders, socioeconomic, cultural, environmental, client behavior characteristics) such as was a swimmer and with sports tends to have overstrengthened anterior musculature , the assessment of modification and need for assistance such as none noted, selection of interventions such as Manual Therapy, Neuromuscular Re-education, Self-Care Home Management, Therapeutic Activities, Therapeutic Exercise, Home Exercise Program instruction, Modalities to include: Ultrasound and hot packs, , and taping  and custom splinting, plan of care (intervention strategies, specialized skills, outcome goals), indicating a low complexity: analysis of data from problem-focused assessment.  These deficits impair the patient's ability to participate in ADLs, IADLs, and meaningful occupational tasks.  Reduced participation in meaningful occupational tasks may increase feelings of sadness and isolation.    Today’s Treatment and Response:   Treatment provided today in addition to the initial evaluation:   Date 1/30/24       Visit # 1       # of visits authorized:       # of visits in OT POC:  12     Evaluation Initial X     Progress Report written        Manual Therapy         IASTM, STM                            Ther ex          forearm stretches x       Radial nerve glides x        cervical stretches x       Scapula add x       Middle and lower traps strengthening         SA strengthening          graded isometrics to wrist extensors                  HEP/  Patient education topics See HEP notes below       Therapeutic Activity        Activity  modification training x                                    Objective measurements taken and reviewed with patient  See above       Neuromuscular Re-education                                   Orthotic fitting and training Reviewed use of wrist brace     Taping Rock tape applied in star formation over right lateral elbow     Modalities Ultrasound:  3mHz  0.8w/cm2  100%  to right lateral elbow  for 8 minutes.       X= performed this date as previously outlined    HEP  On initial eval, patient education was provided on exam findings, treatment diagnosis, treatment plan, expectations, and prognosis. Patient was also provided recommendations for use of heat, counterforce strap, wrist brace, tape.  HEP:  HEP Date Issued Date modified Date discharged Comments    FA/wrist stretches 1/30/24      Radial nerve glides 1/30/24      Scap add 1/30/24      Cervical stretches rotation 1/30/24      Terri scap ex         PLAN:  Goals: (to be met in 12 visits)  Patient will be independent and compliant with comprehensive HEP to maintain progress achieved in OT.  Patient to verbalize and demonstrate understanding of aggravating and alleviating factors related to lateral epicondylitis to decrease risk of recurrence for improved functional use of right UE.  Patient will present with sufficient ROM and strength in the right hand/arm to return to work, self care, homemaking and leisure skill independent performance.  Patient will present with increase in force tolerance of gripping with the right hand in the \"stress position\" to 50 # of force with no more than 2/10 pain to allow for ease with gripping work tools  Patient will trial orthoses and provide feedback on their use and objectives.  Patient will verbalize two considerations for computer work station set up.      Frequency / Duration: Patient will be seen for 2 x/week or a total of 8 visits over a 90 day period.  Treatment will include: Manual Therapy, Neuromuscular Re-education,  Self-Care Home Management, Therapeutic Activities, Therapeutic Exercise, Home Exercise Program instruction, Modalities to include: Ultrasound and hot packs, and taping  and custom splinting.  Next session: wrist proprioceptive re-ed, middle and lower traps/SA strengthening; postural re-ed; US, computer work station review in more detail; instruct in counterforce strap use; tape prn    Education or treatment limitation: None  Rehab Potential:good    Patient was advised of these findings, precautions, and treatment options and has agreed to actively participate in planning and for this course of care.    Charges: OT Eval: Low Complexity, 1 US. 1 TE  Total Timed Treatment: 25 minutes     Total Treatment Time: 45 minutes     Caterina Sky, HUDSONR/L S CHT  Physician's certification required: Yes  I certify the need for these services furnished under this plan of treatment and while under my care. (electronic signature)    X___________________________________________________ Date____________________    Certification From: 1/30/2024  To:4/29/2024

## 2024-01-30 ENCOUNTER — OFFICE VISIT (OUTPATIENT)
Dept: OCCUPATIONAL MEDICINE | Age: 59
End: 2024-01-30
Attending: FAMILY MEDICINE
Payer: COMMERCIAL

## 2024-01-30 DIAGNOSIS — M25.529 ARTHRALGIA OF UPPER ARM, UNSPECIFIED LATERALITY: Primary | ICD-10-CM

## 2024-01-30 DIAGNOSIS — M25.519 ARTHRALGIA OF SHOULDER, UNSPECIFIED LATERALITY: ICD-10-CM

## 2024-01-30 PROCEDURE — 97165 OT EVAL LOW COMPLEX 30 MIN: CPT

## 2024-01-30 PROCEDURE — 97110 THERAPEUTIC EXERCISES: CPT

## 2024-01-30 PROCEDURE — 97035 APP MDLTY 1+ULTRASOUND EA 15: CPT

## 2024-02-01 ENCOUNTER — TELEPHONE (OUTPATIENT)
Dept: OCCUPATIONAL MEDICINE | Age: 59
End: 2024-02-01

## 2024-02-01 ENCOUNTER — APPOINTMENT (OUTPATIENT)
Dept: OCCUPATIONAL MEDICINE | Age: 59
End: 2024-02-01
Attending: FAMILY MEDICINE
Payer: COMMERCIAL

## 2024-02-03 ENCOUNTER — TELEPHONE (OUTPATIENT)
Dept: FAMILY MEDICINE CLINIC | Facility: CLINIC | Age: 59
End: 2024-02-03

## 2024-02-03 DIAGNOSIS — Z00.00 ROUTINE GENERAL MEDICAL EXAMINATION AT A HEALTH CARE FACILITY: Primary | ICD-10-CM

## 2024-02-03 NOTE — TELEPHONE ENCOUNTER
Please enter lab orders for the patient's upcoming physical appointment.     Physical scheduled:   Your appointments       Date & Time Appointment Department ([place])    Apr 30, 2024 12:30 PM CDT Adult Physical with Sowmya Cosby MD [facility] ([facility])    PLEASE NOTE - Most insurances allow a Complete Physical once every 366 days. Please schedule accordingly.    Please arrive 15 minutes prior to your scheduled appointment. Please also bring your Insurance card, Photo ID, and your medication bottles or a list of your current medication.    If you no longer require this appointment, please contact your physician office to cancel.              [facility]  [address] [address]  [phone]           Preferred lab: [facility] ([facility])     The patient has been notified to complete fasting labs prior to their physical appointment.

## 2024-02-08 ENCOUNTER — APPOINTMENT (OUTPATIENT)
Dept: OCCUPATIONAL MEDICINE | Age: 59
End: 2024-02-08
Attending: FAMILY MEDICINE
Payer: COMMERCIAL

## 2024-02-08 ENCOUNTER — TELEPHONE (OUTPATIENT)
Dept: NEUROLOGY | Facility: CLINIC | Age: 59
End: 2024-02-08

## 2024-02-08 NOTE — TELEPHONE ENCOUNTER
New Insurance for 2024.  Requires 2 tried and failed medications even though this is continue of therapy.  Please review chart for medications and route back to the PA team.

## 2024-02-08 NOTE — PROGRESS NOTES
Diagnosis:   Lateral elbow tendinopathy     Arthralgia of upper arm, unspecified laterality (M25.529)  Arthralgia of shoulder, unspecified laterality (M25.519) Referring Provider: Harjeet  Date of Evaluation:    1/30/24    Precautions:  None  Next MD/PA visit: TBS  DOI: 10/2023  Date of Surgery: n/a   Insurance Primary/Secondary: [employer] / N/A     # Auth Visits: n/a             Orders:      Order Date:  1/10/2024  Authorizing Provider:   LORENA ALEMAN     Procedure:  OP REFERRAL TO [facility] [[id number]]         Order #:   [id number]  Qty:  1     Priority:  Routine                   Class:   IHP - RFL     Standing Interval:          Standing Occurrences:          Expires on:            Expected by:    Associated DX:  Arthralgia of upper arm, unspecified laterality (M25.529)  Arthralgia of shoulder, unspecified laterality (M25.519)     Order summary:  IHP - RFL, Routine, 8 visits  Occupational  Therapy Area of Concentration: Orthopedic  Occupational Therapy Ortho: UE  If the patient can be seen sooner with a PT vs an OT, can we cancel this order and replace with a PT order? No         Comments:  Assessment:   Pt tolerated PT tx well today.  Advised pt to consider OT for R elbow pain however plan to continue PT for R shoulder pain.  Progressed R shoulder exercises as tolerated.  Updated HEP.  Mm fasciculations present w/ posterior shoulder exercises in infraspinatus and posterior delt indicating impaired strength and neuromuscular control.     SUBJECTIVE:  \"It hurts today.\"  \"I'm wondering if I should be doing these exercises.\"  Demonstrated active finger/wrist extension against gravity with holding.  \"Do you think I'll ever play pickleball again?\"  Pain: 4/10           OBJECTIVE:           OUTCOME MEASURE   (Initial Eval):  QuickDASH Outcome Score  Score: 38.64 % (1/29/2024  2:53 PM)        ORTHOTICS: has counterforce strap; will bring in.           Hand Strength: (measured in pounds)    Date  1/30/24 Date 1/30/24   Position Right  Left     in standard position 55 63    in stress loaded position 45 8/10 pain 65   3 point pinch 13 13   lateral pinch 17 16      Sensation: Date 1/30/24  Description of sensation: normal, but reports occasional ulnar-sided hand numbness/tingling        Provocative Tests: Date 1/30/24  (+) 7-8/10 pain with long finger resisted extension  (-) 0/10 pain with resisted wrist extension  (+) 4/10 pain with palpation to lateral epicondyle  (-) 0/10 pain with palpation to radial tunnel  (-) 0/10 pain with palpation to lateral supracondylar ridge  (-) 0/10 pain with resistance to Supinator  (-) 0/10 Pain with resistance to Anconeus        Posture: slightly rounded shoulders        NECK SCREEN: reports some 'Stiffness\"             Date 1/30/24 2/13/24     Visit # 1  2     # of visits authorized:          # of visits in OT POC:  12 12     Evaluation Initial X       Progress Report written         Manual Therapy         IASTM, STM    cupping to lateral elbow                         Ther ex          forearm stretches x  reviewed proper     Radial nerve glides x  reviewed      cervical stretches x  reviewed     Scapula add x  reviewed     Middle and lower traps strengthening    5x each     SA strengthening    5x each      graded isometrics to wrist extensors          doorway stretches   x      HEP/  Patient education topics See HEP notes below  HEP proper performance, tape application, wrist brace selection; see also below table     Therapeutic Activity         Activity modification training x                                                          Objective measurements taken and reviewed with patient  See above       Neuromuscular Re-education            wrist proprioceptive re-ed activities   Tennis ball on Frisbee; wrist proprio board seated eyes open and then closed                                   Orthotic fitting and training Reviewed use of wrist brace Wrist brace use  reviewed; instructed in counterforce strap application     Taping Rock tape applied in star formation over right lateral elbow Rock tape applied in star formation over right lateral elbow     Modalities Ultrasound:  3mHz  0.8w/cm2  100%  to right lateral elbow  for 8 minutes.    Ultrasound:  3mHz  0.8w/cm2  100%  to right lateral elbow  for 8 minutes.     X= performed this date as previously outlined     HEP  On initial eval, patient education was provided on exam findings, treatment diagnosis, treatment plan, expectations, and prognosis. Patient was also provided recommendations for use of heat, counterforce strap, wrist brace, tape.  HEP:  HEP Date Issued Date modified Date discharged Comments    FA/wrist stretches 1/30/24 2/13/24 reviewed stretches versus active holds   Radial nerve glides 1/30/24         Scap add 1/30/24         Cervical stretches rotation 1/30/24         Terri scap ex 2/13/24      Doorway/corner stretches; SA with pillow case slides and standing prone on elbow position; middle and lower traps         ASSESSMENT  Patient presents with stable complaints, with some relief while having been sick, not performing HEP nor much usual daily activity due to illness. Some relative rest achieved at that time. However, activation of wrist extensors does provoke pain, so she may benefit from a wrist cock up splint, which she is currently searching for.        PLAN:  Goals: (to be met in 12 visits)  Patient will be independent and compliant with comprehensive HEP to maintain progress achieved in OT. (Progressing)  Patient to verbalize and demonstrate understanding of aggravating and alleviating factors related to lateral epicondylitis to decrease risk of recurrence for improved functional use of right UE. (Progressing)  Patient will present with sufficient ROM and strength in the right hand/arm to return to work, self care, homemaking and leisure skill independent performance. (Progressing)  Patient will  present with increase in force tolerance of gripping with the right hand in the \"stress position\" to 50 # of force with no more than 2/10 pain to allow for ease with gripping work tools. (Progressing)  Patient will trial orthoses and provide feedback on their use and objectives. (Progressing)  Patient will verbalize two considerations for computer work station set up. (Progressing)        Frequency / Duration: Patient will be seen for 2 x/week or a total of 12 visits over a 90 day period.  Treatment will include: Manual Therapy, Neuromuscular Re-education, Self-Care Home Management, Therapeutic Activities, Therapeutic Exercise, Home Exercise Program instruction, Modalities to include: Ultrasound and hot packs, and taping  and custom splinting.     Next session: wrist proprioceptive re-ed, middle and lower traps/SA strengthening; postural re-ed; US, computer work station review in more detail; instruct in counterforce strap use; tape prn     Charges: 1 US, 1 MT, 1 NM, 1 TE        Total Timed Treatment: 47 minutes                      Total Treatment Time: 47 minutes  JEREMY Bennett, OTR/L, CHT

## 2024-02-13 ENCOUNTER — OFFICE VISIT (OUTPATIENT)
Dept: OCCUPATIONAL MEDICINE | Age: 59
End: 2024-02-13
Attending: FAMILY MEDICINE
Payer: COMMERCIAL

## 2024-02-13 PROCEDURE — 97112 NEUROMUSCULAR REEDUCATION: CPT

## 2024-02-13 PROCEDURE — 97140 MANUAL THERAPY 1/> REGIONS: CPT

## 2024-02-13 PROCEDURE — 97035 APP MDLTY 1+ULTRASOUND EA 15: CPT

## 2024-02-13 PROCEDURE — 97110 THERAPEUTIC EXERCISES: CPT

## 2024-02-15 ENCOUNTER — TELEPHONE (OUTPATIENT)
Dept: PHYSICAL THERAPY | Facility: HOSPITAL | Age: 59
End: 2024-02-15

## 2024-02-15 ENCOUNTER — APPOINTMENT (OUTPATIENT)
Dept: OCCUPATIONAL MEDICINE | Age: 59
End: 2024-02-15
Attending: FAMILY MEDICINE
Payer: COMMERCIAL

## 2024-02-15 NOTE — PROGRESS NOTES
Diagnosis:   Lateral elbow tendinopathy     Arthralgia of upper arm, unspecified laterality (M25.529)  Arthralgia of shoulder, unspecified laterality (M25.519) Referring Provider: Harjeet  Date of Evaluation:    1/30/24    Precautions:  None  Next MD/PA visit: TBS  DOI: 10/2023  Date of Surgery: n/a   Insurance Primary/Secondary: [employer] / N/A     # Auth Visits: n/a             Orders:      Order Date:  1/10/2024  Authorizing Provider:   LORENA ALEMAN     Procedure:  OP REFERRAL TO [facility] [[id number]]         Order #:   [id number]  Qty:  1     Priority:  Routine                   Class:   IHP - RFL     Standing Interval:          Standing Occurrences:          Expires on:            Expected by:    Associated DX:  Arthralgia of upper arm, unspecified laterality (M25.529)  Arthralgia of shoulder, unspecified laterality (M25.519)     Order summary:  IHP - RFL, Routine, 8 visits  Occupational  Therapy Area of Concentration: Orthopedic  Occupational Therapy Ortho: UE  If the patient can be seen sooner with a PT vs an OT, can we cancel this order and replace with a PT order? No         Comments:  Assessment:   Pt tolerated PT tx well today.  Advised pt to consider OT for R elbow pain however plan to continue PT for R shoulder pain.  Progressed R shoulder exercises as tolerated.  Updated HEP.  Mm fasciculations present w/ posterior shoulder exercises in infraspinatus and posterior delt indicating impaired strength and neuromuscular control.     SUBJECTIVE:  \"It feels tight.\"  C/o pain with MF activation.     Pain: 2/10           OBJECTIVE:           OUTCOME MEASURE   (Initial Eval):  QuickDASH Outcome Score  Score: 38.64 % (1/29/2024  2:53 PM)                 Hand Strength: (measured in pounds)    Date 1/30/24 Date 1/30/24   Position Right  Left     in standard position 55 63    in stress loaded position 45 8/10 pain 65   3 point pinch 13 13   lateral pinch 17 16      Sensation: Date  1/30/24  Description of sensation: normal, but reports occasional ulnar-sided hand numbness/tingling        Provocative Tests: Date 1/30/24  (+) 7-8/10 pain with long finger resisted extension  (-) 0/10 pain with resisted wrist extension  (+) 4/10 pain with palpation to lateral epicondyle  (-) 0/10 pain with palpation to radial tunnel  (-) 0/10 pain with palpation to lateral supracondylar ridge  (-) 0/10 pain with resistance to Supinator  (-) 0/10 Pain with resistance to Anconeus        Posture: slightly rounded shoulders        NECK SCREEN: reports some 'Stiffness\"              Date 1/30/24 2/13/24 2/19/24   Visit # 1  2  3   # of visits authorized:          # of visits in OT POC:  12 12 12   Evaluation Initial X       Progress Report written         Manual Therapy         IASTM, STM    cupping to lateral elbow  cupping                       Ther ex          forearm stretches x  reviewed proper  x   Radial nerve glides x  reviewed      cervical stretches x  reviewed     Scapula add x  reviewed     Middle and lower traps strengthening    5x each     SA strengthening    5x each  reviewed    graded isometrics to wrist extensors      FA in midposition gripping of yellow bulk putty    doorway stretches   x      HEP/  Patient education topics See HEP notes below  HEP proper performance, tape application, wrist brace selection; see also below table  taping, brace use, computer keyboard set up.   Therapeutic Activity         Activity modification training x                                                          Objective measurements taken and reviewed with patient  See above       Neuromuscular Re-education            wrist proprioceptive re-ed activities   Tennis ball on Frisbee; wrist proprio board seated eyes open and then closed  tennis ball on Frisbee; wrist proprio board eyes open and then closed                                 Orthotic fitting and training Reviewed use of wrist brace Wrist brace use reviewed;  instructed in counterforce strap application  wrist brace application   Taping Rock tape applied in star formation over right lateral elbow Rock tape applied in star formation over right lateral elbow  Rock tape: Distal to prox I strip along MF EDC; and short I strip at anterior elbow to postero-lateral elbow   Modalities Ultrasound:  3mHz  0.8w/cm2  100%  to right lateral elbow  for 8 minutes.    Ultrasound:  3mHz  0.8w/cm2  100%  to right lateral elbow  for 8 minutes. Ultrasound:  3mHz  0.8w/cm2  100%  to right lateral elbow  for 8 minutes.   X= performed this date as previously outlined     HEP  On initial eval, patient education was provided on exam findings, treatment diagnosis, treatment plan, expectations, and prognosis. Patient was also provided recommendations for use of heat, counterforce strap, wrist brace, tape.  HEP:  HEP Date Issued Date modified Date discharged Comments    FA/wrist stretches 1/30/24 2/13/24 reviewed stretches versus active holds   Radial nerve glides 1/30/24         Scap add 1/30/24         Cervical stretches rotation 1/30/24         Terri scap ex 2/13/24      Doorway/corner stretches; SA with pillow case slides and standing prone on elbow position; middle and lower traps         ASSESSMENT  Patient presents with slowly improving symptoms, working on proximal upper limb core control and muscle imbalances.  Anterior chest/pecs are still tight and posterior muscles are weak, but she will continue to focus on this with HEP.  Focus of skilled OT continues to need to be on gradual force tolerance after application of US.        PLAN:  Goals: (to be met in 12 visits)  Patient will be independent and compliant with comprehensive HEP to maintain progress achieved in OT. (Progressing)  Patient to verbalize and demonstrate understanding of aggravating and alleviating factors related to lateral epicondylitis to decrease risk of recurrence for improved functional use of right UE.  (Progressing)  Patient will present with sufficient ROM and strength in the right hand/arm to return to work, self care, homemaking and leisure skill independent performance. (Progressing)  Patient will present with increase in force tolerance of gripping with the right hand in the \"stress position\" to 50 # of force with no more than 2/10 pain to allow for ease with gripping work tools. (Progressing)  Patient will trial orthoses and provide feedback on their use and objectives. (Progressing)  Patient will verbalize two considerations for computer work station set up. (Progressing)        Frequency / Duration: Patient will be seen for 2 x/week or a total of 12 visits over a 90 day period.  Treatment will include: Manual Therapy, Neuromuscular Re-education, Self-Care Home Management, Therapeutic Activities, Therapeutic Exercise, Home Exercise Program instruction, Modalities to include: Ultrasound and hot packs, and taping  and custom splinting.     Next session: wrist proprioceptive re-ed, middle and lower traps/SA strengthening; postural re-ed; US, computer work station review in more detail; instruct in counterforce strap use; tape prn     Charges: 1 US, 1 MT, 1 NM, 1 TE        Total Timed Treatment: 45 minutes                      Total Treatment Time: 45 minutes  JEREMY Bennett, OTR/L, CHT

## 2024-02-16 NOTE — TELEPHONE ENCOUNTER
Received approval from [employer] for Botox for migraines  and [id number].  Approval#[id number] from 01/06/24-01/14/25-4 visits     This is buy and bill.

## 2024-02-19 ENCOUNTER — OFFICE VISIT (OUTPATIENT)
Dept: OCCUPATIONAL MEDICINE | Age: 59
End: 2024-02-19
Attending: FAMILY MEDICINE
Payer: COMMERCIAL

## 2024-02-19 PROCEDURE — 97112 NEUROMUSCULAR REEDUCATION: CPT

## 2024-02-19 PROCEDURE — 97110 THERAPEUTIC EXERCISES: CPT

## 2024-02-19 PROCEDURE — 97140 MANUAL THERAPY 1/> REGIONS: CPT

## 2024-02-19 PROCEDURE — 97035 APP MDLTY 1+ULTRASOUND EA 15: CPT

## 2024-02-19 NOTE — PROGRESS NOTES
Diagnosis:   Lateral elbow tendinopathy     Arthralgia of upper arm, unspecified laterality (M25.529)  Arthralgia of shoulder, unspecified laterality (M25.519) Referring Provider: Harjeet  Date of Evaluation:    1/30/24    Precautions:  None  Next MD/PA visit: TBS  DOI: 10/2023  Date of Surgery: n/a   Insurance Primary/Secondary: [employer]IG[employer] / N/A     # Auth Visits: n/a             Orders:      Order Date:  1/10/2024  Authorizing Provider:   LORENA ALEMAN     Procedure:  OP REFERRAL TO [facility] [[id number]]         Order #:   [id number]  Qty:  1     Priority:  Routine                   Class:   IHP - RFL     Standing Interval:          Standing Occurrences:          Expires on:            Expected by:    Associated DX:  Arthralgia of upper arm, unspecified laterality (M25.529)  Arthralgia of shoulder, unspecified laterality (M25.519)     Order summary:  IHP - RFL, Routine, 8 visits  Occupational  Therapy Area of Concentration: Orthopedic  Occupational Therapy Ortho: UE  If the patient can be seen sooner with a PT vs an OT, can we cancel this order and replace with a PT order? No         Comments:  Assessment:   Pt tolerated PT tx well today.  Advised pt to consider OT for R elbow pain however plan to continue PT for R shoulder pain.  Progressed R shoulder exercises as tolerated.  Updated HEP.  Mm fasciculations present w/ posterior shoulder exercises in infraspinatus and posterior delt indicating impaired strength and neuromuscular control.     SUBJECTIVE:  \"It's getting better.\"   C/o intermittent numbness in SF and ulnar side of hand.    Pain: 2/10           OBJECTIVE:        OUTCOME MEASURE   (Initial Eval):  QuickDASH Outcome Score  Score: 38.64 % (1/29/2024  2:53 PM)                 Hand Strength: (measured in pounds)    Date 1/30/24 Date 1/30/24   Position Right  Left     in standard position 55 63    in stress loaded position 45 8/10 pain 65   3 point pinch 13 13   lateral pinch 17 16       Sensation: Date 1/30/24  Description of sensation: normal, but reports occasional ulnar-sided hand numbness/tingling        Provocative Tests: Date 1/30/24  (+) 7-8/10 pain with long finger resisted extension  (-) 0/10 pain with resisted wrist extension  (+) 4/10 pain with palpation to lateral epicondyle  (-) 0/10 pain with palpation to radial tunnel  (-) 0/10 pain with palpation to lateral supracondylar ridge  (-) 0/10 pain with resistance to Supinator  (-) 0/10 Pain with resistance to Anconeus        Posture: slightly rounded shoulders        NECK SCREEN: reports some 'Stiffness\"              Date 1/30/24 2/13/24 2/19/24 2/21/24   Visit # 1  2  3 4   # of visits authorized:           # of visits in OT POC:  12 12  12 12   Evaluation Initial X        Progress Report written          Manual Therapy          IASTM, STM    cupping to lateral elbow  cupping Cupping, MFR                         Ther ex           forearm stretches x  reviewed proper  x x   Radial nerve glides x  reviewed       cervical stretches x  reviewed      Scapula add x  reviewed      Middle and lower traps strengthening    5x each      SA strengthening    5x each  reviewed     graded isometrics to wrist extensors      FA in midposition gripping of yellow bulk putty     doorway stretches   x   reviewed   Ulnar nerve glides    x    HEP/  Patient education topics See HEP notes below  HEP proper performance, tape application, wrist brace selection; see also below table  taping, brace use, computer keyboard set up. Ulnar nerve glides, use of towel wrap around elbow for night positioning    Therapeutic Activity          Activity modification training x                                                                Objective measurements taken and reviewed with patient  See above        Neuromuscular Re-education             wrist proprioceptive re-ed activities   Tennis ball on Frisbee; wrist proprio board seated eyes open and then closed  tennis  ball on Frisbee; wrist proprio board eyes open and then closed                                     Orthotic fitting and training Reviewed use of wrist brace Wrist brace use reviewed; instructed in counterforce strap application  wrist brace application Wrist brace fitting; flared around ulnar styloid   Taping Rock tape applied in star formation over right lateral elbow Rock tape applied in star formation over right lateral elbow  Rock tape: Distal to prox I strip along MF EDC; and short I strip at anterior elbow to postero-lateral elbow Rock tape space correction to Cubital tunnel   Modalities Ultrasound:  3mHz  0.8w/cm2  100%  to right lateral elbow  for 8 minutes.    Ultrasound:  3mHz  0.8w/cm2  100%  to right lateral elbow  for 8 minutes. Ultrasound:  3mHz  0.8w/cm2  100%  to right lateral elbow  for 8 minutes. Ultrasound:  3mHz  0.8w/cm2  100%  to right lateral elbow  for 8 minutes.   X= performed this date as previously outlined     HEP  On initial eval, patient education was provided on exam findings, treatment diagnosis, treatment plan, expectations, and prognosis. Patient was also provided recommendations for use of heat, counterforce strap, wrist brace, tape.  HEP:  HEP Date Issued Date modified Date discharged Comments    FA/wrist stretches 1/30/24 2/13/24 reviewed stretches versus active holds   Radial nerve glides 1/30/24         Scap add 1/30/24         Cervical stretches rotation 1/30/24         Terri scap ex 2/13/24      Doorway/corner stretches; SA with pillow case slides and standing prone on elbow position; middle and lower traps         ASSESSMENT  Patient presents with mild ulnar nerve irritation, likely from wrist brace rubbing on ulnar styloid.  Tenderness at the cubital tunnel as well, however.        PLAN:  Goals: (to be met in 12 visits)  Patient will be independent and compliant with comprehensive HEP to maintain progress achieved in OT. (Progressing)  Patient to verbalize and  demonstrate understanding of aggravating and alleviating factors related to lateral epicondylitis to decrease risk of recurrence for improved functional use of right UE. (Progressing)  Patient will present with sufficient ROM and strength in the right hand/arm to return to work, self care, homemaking and leisure skill independent performance. (Progressing)  Patient will present with increase in force tolerance of gripping with the right hand in the \"stress position\" to 50 # of force with no more than 2/10 pain to allow for ease with gripping work tools. (Progressing)  Patient will trial orthoses and provide feedback on their use and objectives. (Progressing)  Patient will verbalize two considerations for computer work station set up. (Progressing)        Frequency / Duration: Patient will be seen for 2 x/week or a total of 12 visits over a 90 day period.  Treatment will include: Manual Therapy, Neuromuscular Re-education, Self-Care Home Management, Therapeutic Activities, Therapeutic Exercise, Home Exercise Program instruction, Modalities to include: Ultrasound and hot packs, and taping  and custom splinting.     Next session: wrist proprioceptive re-ed, middle and lower traps/SA strengthening; postural re-ed; US, computer work station review in more detail; instruct in counterforce strap use; tape prn     Charges: 1 US, 2 MT  Total Timed Treatment: 43 minutes                      Total Treatment Time: 43 minutes  JEREMY Bennett, OTR/L, CHT

## 2024-02-21 ENCOUNTER — OFFICE VISIT (OUTPATIENT)
Dept: OCCUPATIONAL MEDICINE | Age: 59
End: 2024-02-21
Attending: FAMILY MEDICINE
Payer: COMMERCIAL

## 2024-02-21 PROCEDURE — 97140 MANUAL THERAPY 1/> REGIONS: CPT

## 2024-02-21 PROCEDURE — 97035 APP MDLTY 1+ULTRASOUND EA 15: CPT

## 2024-02-22 NOTE — PROGRESS NOTES
Diagnosis:   Lateral elbow tendinopathy     Arthralgia of upper arm, unspecified laterality (M25.529)  Arthralgia of shoulder, unspecified laterality (M25.519) Referring Provider: Harjeet  Date of Evaluation:    1/30/24    Precautions:  None  Next MD/PA visit: TBS  DOI: 10/2023  Date of Surgery: n/a   Insurance Primary/Secondary: [employer] / N/A     # Auth Visits: n/a             Orders:      Order Date:  1/10/2024  Authorizing Provider:   LORENA ALEMAN     Procedure:  OP REFERRAL TO [facility] [[id number]]         Order #:   [id number]  Qty:  1     Priority:  Routine                   Class:   IHP - RFL     Standing Interval:          Standing Occurrences:          Expires on:            Expected by:    Associated DX:  Arthralgia of upper arm, unspecified laterality (M25.529)  Arthralgia of shoulder, unspecified laterality (M25.519)     Order summary:  IHP - RFL, Routine, 8 visits  Occupational  Therapy Area of Concentration: Orthopedic  Occupational Therapy Ortho: UE  If the patient can be seen sooner with a PT vs an OT, can we cancel this order and replace with a PT order? No         Comments:  Assessment:   Pt tolerated PT tx well today.  Advised pt to consider OT for R elbow pain however plan to continue PT for R shoulder pain.  Progressed R shoulder exercises as tolerated.  Updated HEP.  Mm fasciculations present w/ posterior shoulder exercises in infraspinatus and posterior delt indicating impaired strength and neuromuscular control.     SUBJECTIVE:  Reports the wrist brace caused ulnar nerve symptoms so she stopped wearing it.    Pain: 2/10           OBJECTIVE:        OUTCOME MEASURE   (Initial Eval):  QuickDASH Outcome Score  Score: 38.64 % (1/29/2024  2:53 PM)                 Hand Strength: (measured in pounds)    Date 1/30/24 Date 1/30/24   Position Right  Left     in standard position 55 63    in stress loaded position 45 8/10 pain 65   3 point pinch 13 13   lateral pinch 17 16       Sensation: Date 1/30/24  Description of sensation: normal, but reports occasional ulnar-sided hand numbness/tingling        Provocative Tests: Date 1/30/24  (+) 7-8/10 pain with long finger resisted extension  (-) 0/10 pain with resisted wrist extension  (+) 4/10 pain with palpation to lateral epicondyle  (-) 0/10 pain with palpation to radial tunnel  (-) 0/10 pain with palpation to lateral supracondylar ridge  (-) 0/10 pain with resistance to Supinator  (-) 0/10 Pain with resistance to Anconeus        Posture: slightly rounded shoulders        NECK SCREEN: reports some 'Stiffness\"              Date 1/30/24 2/13/24 2/19/24 2/21/24 2/27/24   Visit # 1  2  3 4 5   # of visits authorized:            # of visits in OT POC:  12 12  12 12 12   Evaluation Initial X         Progress Report written           Manual Therapy           IASTM, STM    cupping to lateral elbow  cupping Cupping, MFR Cupping   Radial head posterior glides        Grade 1 oscillations               Ther ex            forearm stretches x  reviewed proper  x x    Radial nerve glides x  reviewed    reviewed    cervical stretches x  reviewed       Scapula add x  reviewed       Middle and lower traps strengthening    5x each       SA strengthening    5x each  reviewed      graded isometrics to wrist extensors      FA in midposition gripping of yellow bulk putty  FA in midposition gripping of yellow bulk putty    doorway stretches   x   reviewed    Ulnar nerve glides    x x   Wrist isometrics and eccentrics     Place and hold with dowel mariana only, 3x, 15 second holds and lower    HEP/  Patient education topics See HEP notes below  HEP proper performance, tape application, wrist brace selection; see also below table  taping, brace use, computer keyboard set up. Ulnar nerve glides, use of towel wrap around elbow for night positioning  Hold on stretches; radial head mob's; tape removal with oil; continue with radial nerve glides   Therapeutic Activity            Activity modification training x                                                                      Objective measurements taken and reviewed with patient  See above         Neuromuscular Re-education              wrist proprioceptive re-ed activities   Tennis ball on Frisbee; wrist proprio board seated eyes open and then closed  tennis ball on Frisbee; wrist proprio board eyes open and then closed  Wrist proprio board, tennis ball on frisbee                                       Orthotic fitting and training Reviewed use of wrist brace Wrist brace use reviewed; instructed in counterforce strap application  wrist brace application Wrist brace fitting; flared around ulnar styloid    Taping Rock tape applied in star formation over right lateral elbow Rock tape applied in star formation over right lateral elbow  Rock tape: Distal to prox I strip along MF EDC; and short I strip at anterior elbow to postero-lateral elbow Rock tape space correction to Cubital tunnel Rock tape applied I strip in extensor assist, proximal to distal pull   Modalities Ultrasound:  3mHz  0.8w/cm2  100%  to right lateral elbow  for 8 minutes.    Ultrasound:  3mHz  0.8w/cm2  100%  to right lateral elbow  for 8 minutes. Ultrasound:  3mHz  0.8w/cm2  100%  to right lateral elbow  for 8 minutes. Ultrasound:  3mHz  0.8w/cm2  100%  to right lateral elbow  for 8 minutes. Ultrasound:  3mHz  0.8w/cm2  100%  to right lateral elbow  for 8 minutes.   X= performed this date as previously outlined     HEP  On initial eval, patient education was provided on exam findings, treatment diagnosis, treatment plan, expectations, and prognosis. Patient was also provided recommendations for use of heat, counterforce strap, wrist brace, tape.  HEP:  HEP Date Issued Date modified Date discharged Comments    FA/wrist stretches 1/30/24 2/13/24 reviewed stretches versus active holds   Radial nerve glides 1/30/24         Scap add 1/30/24         Cervical stretches  rotation 1/30/24         Terri scap ex 2/13/24      Doorway/corner stretches; SA with pillow case slides and standing prone on elbow position; middle and lower traps         ASSESSMENT  Patient presents with slowly reducing symptoms in right lateral elbow.  However, still has some flare up, but seems less point tender.  Some pain with isometrics and eccentrics.        PLAN:  Goals: (to be met in 12 visits)  Patient will be independent and compliant with comprehensive HEP to maintain progress achieved in OT. (Progressing)  Patient to verbalize and demonstrate understanding of aggravating and alleviating factors related to lateral epicondylitis to decrease risk of recurrence for improved functional use of right UE. (Progressing)  Patient will present with sufficient ROM and strength in the right hand/arm to return to work, self care, homemaking and leisure skill independent performance. (Progressing)  Patient will present with increase in force tolerance of gripping with the right hand in the \"stress position\" to 50 # of force with no more than 2/10 pain to allow for ease with gripping work tools. (Progressing)  Patient will trial orthoses and provide feedback on their use and objectives. (Progressing)  Patient will verbalize two considerations for computer work station set up. (Progressing)        Frequency / Duration: Patient will be seen for 2 x/week or a total of 12 visits over a 90 day period.  Treatment will include: Manual Therapy, Neuromuscular Re-education, Self-Care Home Management, Therapeutic Activities, Therapeutic Exercise, Home Exercise Program instruction, Modalities to include: Ultrasound and hot packs, and taping  and custom splinting.     Next session: wrist proprioceptive re-ed, graded muscle contraction     Charges: 1 US, 1 TE, 1 NM, 1 MT  Total Timed Treatment: 45 minutes                      Total Treatment Time: 45 minutes  JEREMY Bennett, OTR/L, CHT

## 2024-02-27 ENCOUNTER — OFFICE VISIT (OUTPATIENT)
Dept: OCCUPATIONAL MEDICINE | Age: 59
End: 2024-02-27
Attending: FAMILY MEDICINE
Payer: COMMERCIAL

## 2024-02-27 PROCEDURE — 97110 THERAPEUTIC EXERCISES: CPT

## 2024-02-27 PROCEDURE — 97035 APP MDLTY 1+ULTRASOUND EA 15: CPT

## 2024-02-27 PROCEDURE — 97112 NEUROMUSCULAR REEDUCATION: CPT

## 2024-02-27 PROCEDURE — 97140 MANUAL THERAPY 1/> REGIONS: CPT

## 2024-02-27 NOTE — PROGRESS NOTES
Diagnosis:   Lateral elbow tendinopathy     Arthralgia of upper arm, unspecified laterality (M25.529)  Arthralgia of shoulder, unspecified laterality (M25.519) Referring Provider: Harjeet  Date of Evaluation:    1/30/24    Precautions:  None  Next MD/PA visit: TBS  DOI: 10/2023  Date of Surgery: n/a   Insurance Primary/Secondary: [employer] / N/A     # Auth Visits: n/a             Orders:      Order Date:  1/10/2024  Authorizing Provider:   LORENA ALEMAN     Procedure:  OP REFERRAL TO [facility] [[id number]]         Order #:   [id number]  Qty:  1     Priority:  Routine                   Class:   IHP - RFL     Standing Interval:          Standing Occurrences:          Expires on:            Expected by:    Associated DX:  Arthralgia of upper arm, unspecified laterality (M25.529)  Arthralgia of shoulder, unspecified laterality (M25.519)     Order summary:  IHP - RFL, Routine, 8 visits  Occupational  Therapy Area of Concentration: Orthopedic  Occupational Therapy Ortho: UE  If the patient can be seen sooner with a PT vs an OT, can we cancel this order and replace with a PT order? No         Comments:  Assessment:   Pt tolerated PT tx well today.  Advised pt to consider OT for R elbow pain however plan to continue PT for R shoulder pain.  Progressed R shoulder exercises as tolerated.  Updated HEP.  Mm fasciculations present w/ posterior shoulder exercises in infraspinatus and posterior delt indicating impaired strength and neuromuscular control.     SUBJECTIVE:  \"Okay, maybe a little better.\"    Pain: 2/10           OBJECTIVE:        OUTCOME MEASURE   (Initial Eval):  QuickDASH Outcome Score  Score: 38.64 % (1/29/2024  2:53 PM)                 Hand Strength: (measured in pounds)    Date 1/30/24 Date 1/30/24   Position Right  Left     in standard position 55 63    in stress loaded position 45 8/10 pain 65   3 point pinch 13 13   lateral pinch 17 16      Sensation: Date 1/30/24  Description of  sensation: normal, but reports occasional ulnar-sided hand numbness/tingling        Provocative Tests: Date 1/30/24  (+) 7-8/10 pain with long finger resisted extension  (-) 0/10 pain with resisted wrist extension  (+) 4/10 pain with palpation to lateral epicondyle  (-) 0/10 pain with palpation to radial tunnel  (-) 0/10 pain with palpation to lateral supracondylar ridge  (-) 0/10 pain with resistance to Supinator  (-) 0/10 Pain with resistance to Anconeus      2/29/24:  (+) 3/10 pain with long finger resisted extension  (+) 3/10 pain with palpation to lateral epicondyle        Posture: slightly rounded shoulders        NECK SCREEN: reports some 'Stiffness\"              Date 1/30/24 2/13/24 2/19/24 2/21/24 2/27/24 2/29/24   Visit # 1  2  3 4 5 6   # of visits authorized:             # of visits in OT POC:  12 12  12 12 12 12   Evaluation Initial X          Progress Report written            Manual Therapy            IASTM, STM    cupping to lateral elbow  cupping Cupping, MFR Cupping cupping   Radial head posterior glides        Grade 1 oscillations                 Ther ex             forearm stretches x  reviewed proper  x x     Radial nerve glides x  reviewed    reviewed     cervical stretches x  reviewed        Scapula add x  reviewed        Middle and lower traps strengthening    5x each        SA strengthening    5x each  reviewed       graded isometrics to wrist extensors      FA in midposition gripping of yellow bulk putty  FA in midposition gripping of yellow bulk putty FA in midposition gripping of yellow bulk putty    doorway stretches   x   reviewed     Ulnar nerve glides    x x x   Wrist isometrics and eccentrics     Place and hold with dowel mariana only, 3x, 15 second holds and lower Place and hold with dowel mariana only, 3x, 15 second holds and lower    HEP/  Patient education topics See HEP notes below  HEP proper performance, tape application, wrist brace selection; see also below table  taping, brace  use, computer keyboard set up. Ulnar nerve glides, use of towel wrap around elbow for night positioning  Hold on stretches; radial head mob's; tape removal with oil; continue with radial nerve glides Tape removal   Therapeutic Activity            Activity modification training x                                                                            Objective measurements taken and reviewed with patient  See above       See above   Neuromuscular Re-education               wrist proprioceptive re-ed activities   Tennis ball on Frisbee; wrist proprio board seated eyes open and then closed  tennis ball on Frisbee; wrist proprio board eyes open and then closed  Wrist proprio board, tennis ball on frisbee Wrist proprio board, tennis ball on frisbee                                          Orthotic fitting and training Reviewed use of wrist brace Wrist brace use reviewed; instructed in counterforce strap application  wrist brace application Wrist brace fitting; flared around ulnar styloid     Taping Rock tape applied in star formation over right lateral elbow Rock tape applied in star formation over right lateral elbow  Rock tape: Distal to prox I strip along MF EDC; and short I strip at anterior elbow to postero-lateral elbow Rock tape space correction to Cubital tunnel Rock tape applied I strip in extensor assist, proximal to distal pull Rock tape applied I strip, proximal to distal pull along ECRB   Modalities Ultrasound:  3mHz  0.8w/cm2  100%  to right lateral elbow  for 8 minutes.    Ultrasound:  3mHz  0.8w/cm2  100%  to right lateral elbow  for 8 minutes. Ultrasound:  3mHz  0.8w/cm2  100%  to right lateral elbow  for 8 minutes. Ultrasound:  3mHz  0.8w/cm2  100%  to right lateral elbow  for 8 minutes. Ultrasound:  3mHz  0.8w/cm2  100%  to right lateral elbow  for 8 minutes. Ultrasound:  3mHz  0.8w/cm2  100%  to right lateral elbow  for 8 minutes.   X= performed this date as previously outlined     HEP  On initial  eval, patient education was provided on exam findings, treatment diagnosis, treatment plan, expectations, and prognosis. Patient was also provided recommendations for use of heat, counterforce strap, wrist brace, tape.  HEP:  HEP Date Issued Date modified Date discharged Comments    FA/wrist stretches 1/30/24 2/13/24 reviewed stretches versus active holds   Radial nerve glides 1/30/24         Scap add 1/30/24         Cervical stretches rotation 1/30/24         Terri scap ex 2/13/24      Doorway/corner stretches; SA with pillow case slides and standing prone on elbow position; middle and lower traps         ASSESSMENT  Patient presents with slowly improving symptoms, still provoked with pronated extended position of the UE. Ulnar nerve symptoms intermittently noted; may be too tight counterforce strap application, will need to monitor.     PLAN:  Goals: (to be met in 12 visits)  Patient will be independent and compliant with comprehensive HEP to maintain progress achieved in OT. (Progressing)  Patient to verbalize and demonstrate understanding of aggravating and alleviating factors related to lateral epicondylitis to decrease risk of recurrence for improved functional use of right UE. (Progressing)  Patient will present with sufficient ROM and strength in the right hand/arm to return to work, self care, homemaking and leisure skill independent performance. (Progressing)  Patient will present with increase in force tolerance of gripping with the right hand in the \"stress position\" to 50 # of force with no more than 2/10 pain to allow for ease with gripping work tools. (Progressing)  Patient will trial orthoses and provide feedback on their use and objectives. (Progressing)  Patient will verbalize two considerations for computer work station set up. (Progressing)        Frequency / Duration: Patient will be seen for 2 x/week or a total of 12 visits over a 90 day period.  Treatment will include: Manual Therapy,  Neuromuscular Re-education, Self-Care Home Management, Therapeutic Activities, Therapeutic Exercise, Home Exercise Program instruction, Modalities to include: Ultrasound and hot packs, and taping  and custom splinting.     Next session: wrist proprioceptive re-ed, graded muscle contraction     Charges: 1 US, 1 TE, 1 NM, 1 MT  Total Timed Treatment: 45 minutes                      Total Treatment Time: 45 minutes  JEREMY Bennett, OTR/L, CHT

## 2024-02-29 ENCOUNTER — OFFICE VISIT (OUTPATIENT)
Dept: OCCUPATIONAL MEDICINE | Age: 59
End: 2024-02-29
Attending: FAMILY MEDICINE
Payer: COMMERCIAL

## 2024-02-29 PROCEDURE — 97035 APP MDLTY 1+ULTRASOUND EA 15: CPT

## 2024-02-29 PROCEDURE — 97110 THERAPEUTIC EXERCISES: CPT

## 2024-02-29 PROCEDURE — 97140 MANUAL THERAPY 1/> REGIONS: CPT

## 2024-02-29 PROCEDURE — 97112 NEUROMUSCULAR REEDUCATION: CPT

## 2024-02-29 NOTE — PROGRESS NOTES
Diagnosis:   Lateral elbow tendinopathy     Arthralgia of upper arm, unspecified laterality (M25.529)  Arthralgia of shoulder, unspecified laterality (M25.519) Referring Provider: Harjeet  Date of Evaluation:    1/30/24    Precautions:  None  Next MD/PA visit: TBS  DOI: 10/2023  Date of Surgery: n/a   Insurance Primary/Secondary: [employer] / N/A     # Auth Visits: n/a             Orders:      Order Date:  1/10/2024  Authorizing Provider:   LORENA ALEMAN     Procedure:  OP REFERRAL TO [facility] [[id number]]         Order #:   [id number]  Qty:  1     Priority:  Routine                   Class:   IHP - RFL     Standing Interval:          Standing Occurrences:          Expires on:            Expected by:    Associated DX:  Arthralgia of upper arm, unspecified laterality (M25.529)  Arthralgia of shoulder, unspecified laterality (M25.519)     Order summary:  IHP - RFL, Routine, 8 visits  Occupational  Therapy Area of Concentration: Orthopedic  Occupational Therapy Ortho: UE  If the patient can be seen sooner with a PT vs an OT, can we cancel this order and replace with a PT order? No         Comments:  Assessment:   Pt tolerated PT tx well today.  Advised pt to consider OT for R elbow pain however plan to continue PT for R shoulder pain.  Progressed R shoulder exercises as tolerated.  Updated HEP.  Mm fasciculations present w/ posterior shoulder exercises in infraspinatus and posterior delt indicating impaired strength and neuromuscular control.     SUBJECTIVE:    \"I think we're getting there.\"    Pain: 2/10           OBJECTIVE:        OUTCOME MEASURE   (Initial Eval):  QuickDASH Outcome Score  Score: 38.64 % (1/29/2024  2:53 PM)                 Hand Strength: (measured in pounds)    Date 1/30/24 Date 1/30/24   Position Right  Left     in standard position 55 63    in stress loaded position 45 8/10 pain 65   3 point pinch 13 13   lateral pinch 17 16      Sensation: Date 1/30/24  Description of  sensation: normal, but reports occasional ulnar-sided hand numbness/tingling        Provocative Tests: Date 1/30/24  (+) 7-8/10 pain with long finger resisted extension  (-) 0/10 pain with resisted wrist extension  (+) 4/10 pain with palpation to lateral epicondyle  (-) 0/10 pain with palpation to radial tunnel  (-) 0/10 pain with palpation to lateral supracondylar ridge  (-) 0/10 pain with resistance to Supinator  (-) 0/10 Pain with resistance to Anconeus      2/29/24:  (+) 3/10 pain with long finger resisted extension  (+) 3/10 pain with palpation to lateral epicondyle        Posture: slightly rounded shoulders        NECK SCREEN: reports some 'Stiffness\"              Date  2/13/24  2/19/24 2/21/24 2/27/24 2/29/24 3/5/24   Visit #  2  3 4 5 6 7   # of visits authorized:            # of visits in OT POC:  12  12 12 12 12 12   Evaluation           Progress Report written           Manual Therapy           IASTM, STM  cupping to lateral elbow  cupping Cupping, MFR Cupping cupping cupping   Radial head posterior glides      Grade 1 oscillations                 Ther ex            forearm stretches  reviewed proper  x x      Radial nerve glides  reviewed    reviewed  20x    cervical stretches  reviewed         Scapula add  reviewed         Middle and lower traps strengthening  5x each      reviewed   SA strengthening  5x each  reviewed        graded isometrics to wrist extensors    FA in midposition gripping of yellow bulk putty  FA in midposition gripping of yellow bulk putty FA in midposition gripping of yellow bulk putty FA in midposition gripping of yellow bulk putty    doorway stretches x   reviewed   reviewed   Ulnar nerve glides   x x x x   Wrist isometrics and eccentrics    Place and hold with dowel mariana only, 3x, 15 second holds and lower Place and hold with dowel mariana only, 3x, 15 second holds and lower Place and hold with yellow flexbar, 10 and 15 second holds and lowers, 4x    HEP/  Patient education topics   HEP proper performance, tape application, wrist brace selection; see also below table  taping, brace use, computer keyboard set up. Ulnar nerve glides, use of towel wrap around elbow for night positioning  Hold on stretches; radial head mob's; tape removal with oil; continue with radial nerve glides Tape removal OT POC   Try isometrics and eccentrics at home with light handhold.   Therapeutic Activity           Activity modification training                                                                        Objective measurements taken and reviewed with patient       See above    Neuromuscular Re-education            wrist proprioceptive re-ed activities Tennis ball on Frisbee; wrist proprio board seated eyes open and then closed  tennis ball on Frisbee; wrist proprio board eyes open and then closed  Wrist proprio board, tennis ball on frisbee Wrist proprio board, tennis ball on frisbee Lacrosse ball on game tray, unilateral; hand wrist balance board                                       Orthotic fitting and training Wrist brace use reviewed; instructed in counterforce strap application  wrist brace application Wrist brace fitting; flared around ulnar styloid      Taping Rock tape applied in star formation over right lateral elbow  Rock tape: Distal to prox I strip along MF EDC; and short I strip at anterior elbow to postero-lateral elbow Rock tape space correction to Cubital tunnel Rock tape applied I strip in extensor assist, proximal to distal pull Rock tape applied I strip, proximal to distal pull along ECRB Rock tape in star over lateral elbow     Modalities Ultrasound:  3mHz  0.8w/cm2  100%  to right lateral elbow  for 8 minutes. Ultrasound:  3mHz  0.8w/cm2  100%  to right lateral elbow  for 8 minutes. Ultrasound:  3mHz  0.8w/cm2  100%  to right lateral elbow  for 8 minutes. Ultrasound:  3mHz  0.8w/cm2  100%  to right lateral elbow  for 8 minutes. Ultrasound:  3mHz  0.8w/cm2  100%  to right lateral elbow   for 8 minutes. Ultrasound:  3mHz  1.0w/cm2  100%  to right lateral elbow  for 8 minutes.     X= performed this date as previously outlined     HEP  On initial eval, patient education was provided on exam findings, treatment diagnosis, treatment plan, expectations, and prognosis. Patient was also provided recommendations for use of heat, counterforce strap, wrist brace, tape.  HEP:  HEP Date Issued Date modified Date discharged Comments    FA/wrist stretches 1/30/24 2/13/24 reviewed stretches versus active holds   Radial nerve glides 1/30/24         Scap add 1/30/24         Cervical stretches rotation 1/30/24         Terri scap ex 2/13/24      Doorway/corner stretches; SA with pillow case slides and standing prone on elbow position; middle and lower traps         ASSESSMENT  Patient presents with slowly improving symptoms; able to increase to mild resistance with isometrics and eccentrics.  Mild crepitus noted with US still.      PLAN:  Goals: (to be met in 12 visits)  Patient will be independent and compliant with comprehensive HEP to maintain progress achieved in OT. (Progressing)  Patient to verbalize and demonstrate understanding of aggravating and alleviating factors related to lateral epicondylitis to decrease risk of recurrence for improved functional use of right UE. (Progressing)  Patient will present with sufficient ROM and strength in the right hand/arm to return to work, self care, homemaking and leisure skill independent performance. (Progressing)  Patient will present with increase in force tolerance of gripping with the right hand in the \"stress position\" to 50 # of force with no more than 2/10 pain to allow for ease with gripping work tools. (Progressing)  Patient will trial orthoses and provide feedback on their use and objectives. (Progressing)  Patient will verbalize two considerations for computer work station set up. (Progressing)        Frequency / Duration: Patient will be seen for 2 x/week  or a total of 12 visits over a 90 day period.  Treatment will include: Manual Therapy, Neuromuscular Re-education, Self-Care Home Management, Therapeutic Activities, Therapeutic Exercise, Home Exercise Program instruction, Modalities to include: Ultrasound and hot packs, and taping  and custom splinting.     Next session: wrist proprioceptive re-ed, graded muscle contraction     Charges: 1 US, 1 TE, 1 NM, 1 MT  Total Timed Treatment: 45 minutes                      Total Treatment Time: 45 minutes  JEREMY Bennett, OTR/L, CHT

## 2024-03-05 ENCOUNTER — OFFICE VISIT (OUTPATIENT)
Dept: OCCUPATIONAL MEDICINE | Age: 59
End: 2024-03-05
Attending: FAMILY MEDICINE
Payer: COMMERCIAL

## 2024-03-05 PROCEDURE — 97035 APP MDLTY 1+ULTRASOUND EA 15: CPT

## 2024-03-05 PROCEDURE — 97110 THERAPEUTIC EXERCISES: CPT

## 2024-03-05 PROCEDURE — 97140 MANUAL THERAPY 1/> REGIONS: CPT

## 2024-03-05 PROCEDURE — 97112 NEUROMUSCULAR REEDUCATION: CPT

## 2024-03-05 NOTE — PROGRESS NOTES
Diagnosis:   Lateral elbow tendinopathy     Arthralgia of upper arm, unspecified laterality (M25.529)  Arthralgia of shoulder, unspecified laterality (M25.519) Referring Provider: Harjeet  Date of Evaluation:    1/30/24    Precautions:  None  Next MD/PA visit: TBS  DOI: 10/2023  Date of Surgery: n/a   Insurance Primary/Secondary: [employer] / N/A     # Auth Visits: n/a             Orders:      Order Date:  1/10/2024  Authorizing Provider:   LORENA ALEMAN     Procedure:  OP REFERRAL TO [facility] [[id number]]         Order #:   [id number]  Qty:  1     Priority:  Routine                   Class:   IHP - RFL     Standing Interval:          Standing Occurrences:          Expires on:            Expected by:    Associated DX:  Arthralgia of upper arm, unspecified laterality (M25.529)  Arthralgia of shoulder, unspecified laterality (M25.519)     Order summary:  IHP - RFL, Routine, 8 visits  Occupational  Therapy Area of Concentration: Orthopedic  Occupational Therapy Ortho: UE  If the patient can be seen sooner with a PT vs an OT, can we cancel this order and replace with a PT order? No         Comments:  Assessment:   Pt tolerated PT tx well today.  Advised pt to consider OT for R elbow pain however plan to continue PT for R shoulder pain.  Progressed R shoulder exercises as tolerated.  Updated HEP.  Mm fasciculations present w/ posterior shoulder exercises in infraspinatus and posterior delt indicating impaired strength and neuromuscular control.     SUBJECTIVE:    C/o posterolateral elbow pain yesterday.    Pain: 1/10           OBJECTIVE:        OUTCOME MEASURE   (Initial Eval):  QuickDASH Outcome Score  Score: 38.64 % (1/29/2024  2:53 PM)                 Hand Strength: (measured in pounds)    Date 1/30/24 Date 1/30/24   Position Right  Left     in standard position 55 63    in stress loaded position 45 8/10 pain 65   3 point pinch 13 13   lateral pinch 17 16      Sensation: Date 1/30/24  Description  of sensation: normal, but reports occasional ulnar-sided hand numbness/tingling        Provocative Tests: Date 1/30/24  (+) 7-8/10 pain with long finger resisted extension  (-) 0/10 pain with resisted wrist extension  (+) 4/10 pain with palpation to lateral epicondyle  (-) 0/10 pain with palpation to radial tunnel  (-) 0/10 pain with palpation to lateral supracondylar ridge  (-) 0/10 pain with resistance to Supinator  (-) 0/10 Pain with resistance to Anconeus      2/29/24:  (+) 3/10 pain with long finger resisted extension  (+) 3/10 pain with palpation to lateral epicondyle        Posture: slightly rounded shoulders        NECK SCREEN: reports some 'Stiffness\"              Date  2/19/24 2/21/24 2/27/24 2/29/24 3/5/24 3/7/24   Visit #  3 4 5 6 7 8   # of visits authorized:           # of visits in OT POC:   12 12 12 12 12 12   Evaluation          Progress Report written          Manual Therapy          IASTM, STM  cupping Cupping, MFR Cupping cupping cupping Cupping, flexbar rolling over soft tissues   Radial head posterior glides    Grade 1 oscillations                 Ther ex           forearm stretches  x x       Radial nerve glides    reviewed  20x     cervical stretches          SA against ball on wall       30 seconds, 6x   Middle and lower traps strengthening      reviewed    SA strengthening  reviewed         graded isometrics to wrist extensors  FA in midposition gripping of yellow bulk putty  FA in midposition gripping of yellow bulk putty FA in midposition gripping of yellow bulk putty FA in midposition gripping of yellow bulk putty FA in midposition gripping of yellow bulk putty; graded  with FA pronated, of putty    doorway stretches   reviewed   reviewed    Ulnar nerve glides  x x x x    Wrist isometrics and eccentrics   Place and hold with dowel mariana only, 3x, 15 second holds and lower Place and hold with dowel mariana only, 3x, 15 second holds and lower Place and hold with yellow flexbar, 10 and 15  second holds and lowers, 4x Place and hold with yellow flexbar, 15 second holds and lowers, 6x   PNF diagonals      15x bilateral, II    HEP/  Patient education topics  taping, brace use, computer keyboard set up. Ulnar nerve glides, use of towel wrap around elbow for night positioning  Hold on stretches; radial head mob's; tape removal with oil; continue with radial nerve glides Tape removal OT POC   Try isometrics and eccentrics at home with light handhold. OT POC, use of Tiger Tail roller   Therapeutic Activity          Activity modification training                                                                  Objective measurements taken and reviewed with patient     See above     Neuromuscular Re-education           wrist proprioceptive re-ed activities  tennis ball on Frisbee; wrist proprio board eyes open and then closed  Wrist proprio board, tennis ball on frisbee Wrist proprio board, tennis ball on frisbee Lacrosse ball on game tray, unilateral; hand wrist balance board                                     Orthotic fitting and training  wrist brace application Wrist brace fitting; flared around ulnar styloid       Taping  Rock tape: Distal to prox I strip along MF EDC; and short I strip at anterior elbow to postero-lateral elbow Rock tape space correction to Cubital tunnel Rock tape applied I strip in extensor assist, proximal to distal pull Rock tape applied I strip, proximal to distal pull along ECRB Rock tape in star over lateral elbow   Deferred    Modalities Ultrasound:  3mHz  0.8w/cm2  100%  to right lateral elbow  for 8 minutes. Ultrasound:  3mHz  0.8w/cm2  100%  to right lateral elbow  for 8 minutes. Ultrasound:  3mHz  0.8w/cm2  100%  to right lateral elbow  for 8 minutes. Ultrasound:  3mHz  0.8w/cm2  100%  to right lateral elbow  for 8 minutes. Ultrasound:  3mHz  1.0w/cm2  100%  to right lateral elbow  for 8 minutes.   Ultrasound:  3mHz  1.0w/cm2  100%  to right lateral elbow  for 8 minutes.    X= performed this date as previously outlined     HEP  On initial eval, patient education was provided on exam findings, treatment diagnosis, treatment plan, expectations, and prognosis. Patient was also provided recommendations for use of heat, counterforce strap, wrist brace, tape.  HEP:  HEP Date Issued Date modified Date discharged Comments    FA/wrist stretches 1/30/24 2/13/24 reviewed stretches versus active holds   Radial nerve glides 1/30/24         Scap add 1/30/24         Cervical stretches rotation 1/30/24         Terri scap ex 2/13/24      Doorway/corner stretches; SA with pillow case slides and standing prone on elbow position; middle and lower traps         ASSESSMENT  Patient presents with improving symptoms in right lateral elbow. No pain with graded isometrics.  Appears to be tolerating more force and activity. May be ready to try Sci Fit next session and other advanced strengthening.      PLAN:  Goals: (to be met in 12 visits)  Patient will be independent and compliant with comprehensive HEP to maintain progress achieved in OT. (Progressing)  Patient to verbalize and demonstrate understanding of aggravating and alleviating factors related to lateral epicondylitis to decrease risk of recurrence for improved functional use of right UE. (Progressing)  Patient will present with sufficient ROM and strength in the right hand/arm to return to work, self care, homemaking and leisure skill independent performance. (Progressing)  Patient will present with increase in force tolerance of gripping with the right hand in the \"stress position\" to 50 # of force with no more than 2/10 pain to allow for ease with gripping work tools. (Progressing)  Patient will trial orthoses and provide feedback on their use and objectives. (Progressing)  Patient will verbalize two considerations for computer work station set up. (Progressing)        Frequency / Duration: Patient will be seen for 2 x/week or a total of 12  visits over a 90 day period.  Treatment will include: Manual Therapy, Neuromuscular Re-education, Self-Care Home Management, Therapeutic Activities, Therapeutic Exercise, Home Exercise Program instruction, Modalities to include: Ultrasound and hot packs, and taping  and custom splinting.     Next session: wrist proprioceptive re-ed, graded muscle contraction with yellow putty; 1# eccentrics, consider Sci Fit     Charges: 1 US, 2 TE, 1 MT  Total Timed Treatment: 45 minutes                      Total Treatment Time: 45 minutes  JEREMY Bennett, OTR/L, CHT

## 2024-03-07 ENCOUNTER — OFFICE VISIT (OUTPATIENT)
Dept: OCCUPATIONAL MEDICINE | Age: 59
End: 2024-03-07
Attending: FAMILY MEDICINE
Payer: COMMERCIAL

## 2024-03-07 PROCEDURE — 97110 THERAPEUTIC EXERCISES: CPT

## 2024-03-07 PROCEDURE — 97140 MANUAL THERAPY 1/> REGIONS: CPT

## 2024-03-07 PROCEDURE — 97035 APP MDLTY 1+ULTRASOUND EA 15: CPT

## 2024-03-07 NOTE — PROGRESS NOTES
Diagnosis:   Lateral elbow tendinopathy     Arthralgia of upper arm, unspecified laterality (M25.529)  Arthralgia of shoulder, unspecified laterality (M25.519) Referring Provider: Harjeet  Date of Evaluation:    1/30/24    Precautions:  None  Next MD/PA visit: TBS  DOI: 10/2023  Date of Surgery: n/a   Insurance Primary/Secondary: [employer] / N/A     # Auth Visits: n/a             Orders:      Order Date:  1/10/2024  Authorizing Provider:   LORENA ALEMAN     Procedure:  OP REFERRAL TO [facility] [[id number]]         Order #:   [id number]  Qty:  1     Priority:  Routine                   Class:   IHP - RFL     Standing Interval:          Standing Occurrences:          Expires on:            Expected by:    Associated DX:  Arthralgia of upper arm, unspecified laterality (M25.529)  Arthralgia of shoulder, unspecified laterality (M25.519)     Order summary:  IHP - RFL, Routine, 8 visits  Occupational  Therapy Area of Concentration: Orthopedic  Occupational Therapy Ortho: UE  If the patient can be seen sooner with a PT vs an OT, can we cancel this order and replace with a PT order? No         Comments:  Assessment:   Pt tolerated PT tx well today.  Advised pt to consider OT for R elbow pain however plan to continue PT for R shoulder pain.  Progressed R shoulder exercises as tolerated.  Updated HEP.  Mm fasciculations present w/ posterior shoulder exercises in infraspinatus and posterior delt indicating impaired strength and neuromuscular control.     SUBJECTIVE:  \"It's better.\"      Pain: 1/10           OBJECTIVE:        OUTCOME MEASURE   (Initial Eval):  QuickDASH Outcome Score  Score: 38.64 % (1/29/2024  2:53 PM)                 Hand Strength: (measured in pounds)    Date 1/30/24 Date 1/30/24   Position Right  Left     in standard position 55 63    in stress loaded position 45 8/10 pain 65   3 point pinch 13 13   lateral pinch 17 16      Sensation: Date 1/30/24  Description of sensation: normal, but  reports occasional ulnar-sided hand numbness/tingling        Provocative Tests: Date 1/30/24  (+) 7-8/10 pain with long finger resisted extension  (-) 0/10 pain with resisted wrist extension  (+) 4/10 pain with palpation to lateral epicondyle  (-) 0/10 pain with palpation to radial tunnel  (-) 0/10 pain with palpation to lateral supracondylar ridge  (-) 0/10 pain with resistance to Supinator  (-) 0/10 Pain with resistance to Anconeus      2/29/24:  (+) 3/10 pain with long finger resisted extension  (+) 3/10 pain with palpation to lateral epicondyle        Posture: slightly rounded shoulders        NECK SCREEN: reports some 'Stiffness\"              Date 2/21/24 2/27/24 2/29/24 3/5/24 3/7/24 3/12/24   Visit # 4 5 6 7 8 9   # of visits authorized:          # of visits in OT POC:  12 12 12 12 12 12   Evaluation         Progress Report written         Manual Therapy         IASTM, STM Cupping, MFR Cupping cupping cupping Cupping, flexbar rolling over soft tissues Cupping, yellow flexbar roll over lateral elbow soft tissues   Radial head posterior glides  Grade 1 oscillations                 Ther ex          forearm stretches x        Radial nerve glides  reviewed  20x  x    cervical stretches         SA against ball on wall     30 seconds, 6x 30 seconds with 0.5 kg, 6x   Middle and lower traps strengthening    reviewed     SA strengthening          graded isometrics to wrist extensors  FA in midposition gripping of yellow bulk putty FA in midposition gripping of yellow bulk putty FA in midposition gripping of yellow bulk putty FA in midposition gripping of yellow bulk putty; graded  with FA pronated, of putty FA in midposition gripping of yellow bulk putty; graded  with FA pronated, of putty    doorway stretches reviewed   reviewed     Ulnar nerve glides x x x x     Wrist isometrics and eccentrics  Place and hold with dowel mariana only, 3x, 15 second holds and lower Place and hold with dowel mariana only, 3x, 15 second  holds and lower Place and hold with yellow flexbar, 10 and 15 second holds and lowers, 4x Place and hold with yellow flexbar, 15 second holds and lowers, 6x Place and hold with 1#; 20 seconds each 3x; eccentrics with yellow flexbar 4x   PNF diagonals     15x bilateral, II 15x bilateral with 1# in each hand   SciFit      1 min each direction    HEP/  Patient education topics Ulnar nerve glides, use of towel wrap around elbow for night positioning  Hold on stretches; radial head mob's; tape removal with oil; continue with radial nerve glides Tape removal OT POC   Try isometrics and eccentrics at home with light handhold. OT POC, use of Tiger Tail roller Use of counterforce strap   Therapeutic Activity         Activity modification training                                                            Objective measurements taken and reviewed with patient   See above      Neuromuscular Re-education          wrist proprioceptive re-ed activities  Wrist proprio board, tennis ball on frisbee Wrist proprio board, tennis ball on frisbee Lacrosse ball on game tray, unilateral; hand wrist balance board  Unilateral lacrosse ball on game tray; pertubations with yellow flexbar; True Balance                                 Orthotic fitting and training Wrist brace fitting; flared around ulnar styloid        Taping Rock tape space correction to Cubital tunnel Rock tape applied I strip in extensor assist, proximal to distal pull Rock tape applied I strip, proximal to distal pull along ECRB Rock tape in star over lateral elbow   Deferred     Modalities Ultrasound:  3mHz  0.8w/cm2  100%  to right lateral elbow  for 8 minutes. Ultrasound:  3mHz  0.8w/cm2  100%  to right lateral elbow  for 8 minutes. Ultrasound:  3mHz  0.8w/cm2  100%  to right lateral elbow  for 8 minutes. Ultrasound:  3mHz  1.0w/cm2  100%  to right lateral elbow  for 8 minutes.   Ultrasound:  3mHz  1.0w/cm2  100%  to right lateral elbow  for 8 minutes.  Ultrasound:  3mHz  1.0w/cm2  100%  to right lateral elbow  for 8 minutes.   X= performed this date as previously outlined     HEP  On initial eval, patient education was provided on exam findings, treatment diagnosis, treatment plan, expectations, and prognosis. Patient was also provided recommendations for use of heat, counterforce strap, wrist brace, tape.  HEP:  HEP Date Issued Date modified Date discharged Comments    FA/wrist stretches 1/30/24 2/13/24 reviewed stretches versus active holds   Radial nerve glides 1/30/24         Scap add 1/30/24         Cervical stretches rotation 1/30/24         Terri scap ex 2/13/24      Doorway/corner stretches; SA with pillow case slides and standing prone on elbow position; middle and lower traps         ASSESSMENT  Patient presents with slowly improving symptoms in right lateral elbow. Able to upgrade isometrics and eccentrics as noted above without pain produced.  Anticipate upgrading next session with longer lever arm tools, but need her counterforce strap for this.      PLAN:  Goals: (to be met in 12 visits)  Patient will be independent and compliant with comprehensive HEP to maintain progress achieved in OT. (Progressing)  Patient to verbalize and demonstrate understanding of aggravating and alleviating factors related to lateral epicondylitis to decrease risk of recurrence for improved functional use of right UE. (Progressing)  Patient will present with sufficient ROM and strength in the right hand/arm to return to work, self care, homemaking and leisure skill independent performance. (Progressing)  Patient will present with increase in force tolerance of gripping with the right hand in the \"stress position\" to 50 # of force with no more than 2/10 pain to allow for ease with gripping work tools. (Progressing)  Patient will trial orthoses and provide feedback on their use and objectives. (Progressing)  Patient will verbalize two considerations for computer work  station set up. (Progressing)        Frequency / Duration: Patient will be seen for 2 x/week or a total of 12 visits over a 90 day period.  Treatment will include: Manual Therapy, Neuromuscular Re-education, Self-Care Home Management, Therapeutic Activities, Therapeutic Exercise, Home Exercise Program instruction, Modalities to include: Ultrasound and hot packs, and taping  and custom splinting.     Next session: wrist proprioceptive re-ed, graded muscle contraction with yellow putty; 1# eccentrics, continue Sci Fit;  extended FA neutral eccentrics with yellow flexbar     Charges: 1 US, 2 TE, 1 NM  Total Timed Treatment: 45 minutes                      Total Treatment Time: 45 minutes  JEREMY Bennett, OTR/L, CHT

## 2024-03-12 ENCOUNTER — OFFICE VISIT (OUTPATIENT)
Dept: OCCUPATIONAL MEDICINE | Age: 59
End: 2024-03-12
Attending: FAMILY MEDICINE
Payer: COMMERCIAL

## 2024-03-12 PROCEDURE — 97110 THERAPEUTIC EXERCISES: CPT

## 2024-03-12 PROCEDURE — 97112 NEUROMUSCULAR REEDUCATION: CPT

## 2024-03-12 PROCEDURE — 97035 APP MDLTY 1+ULTRASOUND EA 15: CPT

## 2024-03-12 NOTE — PROGRESS NOTES
Diagnosis:   Lateral elbow tendinopathy     Arthralgia of upper arm, unspecified laterality (M25.529)  Arthralgia of shoulder, unspecified laterality (M25.519) Referring Provider: Harjeet  Date of Evaluation:    1/30/24    Precautions:  None  Next MD/PA visit: TBS  DOI: 10/2023  Date of Surgery: n/a   Insurance Primary/Secondary: [employer] / N/A     # Auth Visits: n/a             Orders:      Order Date:  1/10/2024  Authorizing Provider:   LORENA ALEMAN     Procedure:  OP REFERRAL TO [facility] [[id number]]         Order #:   [id number]  Qty:  1     Priority:  Routine                   Class:   IHP - RFL     Standing Interval:          Standing Occurrences:          Expires on:            Expected by:    Associated DX:  Arthralgia of upper arm, unspecified laterality (M25.529)  Arthralgia of shoulder, unspecified laterality (M25.519)     Order summary:  IHP - RFL, Routine, 8 visits  Occupational  Therapy Area of Concentration: Orthopedic  Occupational Therapy Ortho: UE  If the patient can be seen sooner with a PT vs an OT, can we cancel this order and replace with a PT order? No         Comments:  Assessment:   Pt tolerated PT tx well today.  Advised pt to consider OT for R elbow pain however plan to continue PT for R shoulder pain.  Progressed R shoulder exercises as tolerated.  Updated HEP.  Mm fasciculations present w/ posterior shoulder exercises in infraspinatus and posterior delt indicating impaired strength and neuromuscular control.     SUBJECTIVE:  Reports a little more sensitive and sore today due to a migraine.      Pain: 1/10           OBJECTIVE:        OUTCOME MEASURE   (Initial Eval):  QuickDASH Outcome Score  Score: 38.64 % (1/29/2024  2:53 PM)                 Hand Strength: (measured in pounds)    Date 1/30/24 Date 1/30/24   Position Right  Left     in standard position 55 63    in stress loaded position 45 8/10 pain 65   3 point pinch 13 13   lateral pinch 17 16      Sensation:  Date 1/30/24  Description of sensation: normal, but reports occasional ulnar-sided hand numbness/tingling        Provocative Tests: Date 1/30/24  (+) 7-8/10 pain with long finger resisted extension  (-) 0/10 pain with resisted wrist extension  (+) 4/10 pain with palpation to lateral epicondyle  (-) 0/10 pain with palpation to radial tunnel  (-) 0/10 pain with palpation to lateral supracondylar ridge  (-) 0/10 pain with resistance to Supinator  (-) 0/10 Pain with resistance to Anconeus      2/29/24:  (+) 3/10 pain with long finger resisted extension  (+) 3/10 pain with palpation to lateral epicondyle        Posture: slightly rounded shoulders        NECK SCREEN: reports some 'Stiffness\"              Date 2/27/24 2/29/24 3/5/24 3/7/24 3/12/24 3/14/24   Visit # 5 6 7 8 9 10   # of visits authorized:          # of visits in OT POC:  12 12 12 12 12 12   Evaluation         Progress Report written         Manual Therapy         IASTM, STM Cupping cupping cupping Cupping, flexbar rolling over soft tissues Cupping, yellow flexbar roll over lateral elbow soft tissues Cupping, yellow flexbar roll over lateral elbow soft tissues   Radial head posterior glides Grade 1 oscillations                  Ther ex          forearm stretches         Radial nerve glides reviewed  20x  x x    cervical stretches         SA against ball on wall    30 seconds, 6x 30 seconds with 0.5 kg, 6x    Middle and lower traps strengthening   reviewed      SA strengthening          graded isometrics to wrist extensors FA in midposition gripping of yellow bulk putty FA in midposition gripping of yellow bulk putty FA in midposition gripping of yellow bulk putty FA in midposition gripping of yellow bulk putty; graded  with FA pronated, of putty FA in midposition gripping of yellow bulk putty; graded  with FA pronated, of putty FA in midposition gripping of yellow bulk putty; graded  with FA pronated, of putty    doorway stretches   reviewed       Ulnar nerve glides x x x      Wrist isometrics and eccentrics Place and hold with dowel mariana only, 3x, 15 second holds and lower Place and hold with dowel mariana only, 3x, 15 second holds and lower Place and hold with yellow flexbar, 10 and 15 second holds and lowers, 4x Place and hold with yellow flexbar, 15 second holds and lowers, 6x Place and hold with 1#; 20 seconds each 3x; eccentrics with yellow flexbar 4x Place and hold with 1#; 20 seconds each 3x; eccentrics with yellow flexbar 4x   PNF diagonals    15x bilateral, II 15x bilateral with 1# in each hand    SciFit     1 min each direction     HEP/  Patient education topics Hold on stretches; radial head mob's; tape removal with oil; continue with radial nerve glides Tape removal OT POC   Try isometrics and eccentrics at home with light handhold. OT POC, use of Tiger Tail roller Use of counterforce strap OT POC   Therapeutic Activity         Activity modification training                                                            Objective measurements taken and reviewed with patient  See above       Neuromuscular Re-education          wrist proprioceptive re-ed activities Wrist proprio board, tennis ball on frisbee Wrist proprio board, tennis ball on frisbee Lacrosse ball on game tray, unilateral; hand wrist balance board  Unilateral lacrosse ball on game tray; pertubations with yellow flexbar; True Balance Unilateral lacrosse ball on game tray; pertubations with yellow flexbar and yellow power web                                 Orthotic fitting and training         Taping Rock tape applied I strip in extensor assist, proximal to distal pull Rock tape applied I strip, proximal to distal pull along ECRB Rock tape in star over lateral elbow   Deferred      Modalities Ultrasound:  3mHz  0.8w/cm2  100%  to right lateral elbow  for 8 minutes. Ultrasound:  3mHz  0.8w/cm2  100%  to right lateral elbow  for 8 minutes. Ultrasound:  3mHz  1.0w/cm2  100%  to right lateral  elbow  for 8 minutes.   Ultrasound:  3mHz  1.0w/cm2  100%  to right lateral elbow  for 8 minutes. Ultrasound:  3mHz  1.0w/cm2  100%  to right lateral elbow  for 8 minutes. Ultrasound:  3mHz  1.0w/cm2  100%  to right lateral elbow  for 8 minutes.   X= performed this date as previously outlined     HEP  On initial eval, patient education was provided on exam findings, treatment diagnosis, treatment plan, expectations, and prognosis. Patient was also provided recommendations for use of heat, counterforce strap, wrist brace, tape.  HEP:  HEP Date Issued Date modified Date discharged Comments    FA/wrist stretches 1/30/24 2/13/24 reviewed stretches versus active holds   Radial nerve glides 1/30/24         Scap add 1/30/24         Cervical stretches rotation 1/30/24         Terri scap ex 2/13/24      Doorway/corner stretches; SA with pillow case slides and standing prone on elbow position; middle and lower traps         ASSESSMENT  Patient presents with improving symptoms and tolerance of force in the RUE/lateral elbow.  Anticipate she will be ready for planned dc next week.      PLAN:  Goals: (to be met in 12 visits)  Patient will be independent and compliant with comprehensive HEP to maintain progress achieved in OT. (Progressing)  Patient to verbalize and demonstrate understanding of aggravating and alleviating factors related to lateral epicondylitis to decrease risk of recurrence for improved functional use of right UE. (Progressing)  Patient will present with sufficient ROM and strength in the right hand/arm to return to work, self care, homemaking and leisure skill independent performance. (Progressing)  Patient will present with increase in force tolerance of gripping with the right hand in the \"stress position\" to 50 # of force with no more than 2/10 pain to allow for ease with gripping work tools. (Progressing)  Patient will trial orthoses and provide feedback on their use and objectives.  (Progressing)  Patient will verbalize two considerations for computer work station set up. (Progressing)        Frequency / Duration: Patient will be seen for 2 x/week or a total of 12 visits over a 90 day period.  Treatment will include: Manual Therapy, Neuromuscular Re-education, Self-Care Home Management, Therapeutic Activities, Therapeutic Exercise, Home Exercise Program instruction, Modalities to include: Ultrasound and hot packs, and taping  and custom splinting.     Next session: wrist proprioceptive re-ed, graded muscle contraction with yellow putty; 1# eccentrics, continue Sci Fit;  extended FA neutral eccentrics with yellow flexbar; begin transition to HEP and self management.     Charges: 1 US, 2 TE, 1 NM  Total Timed Treatment: 45 minutes                      Total Treatment Time: 45 minutes  JEREMY Bennett, OTR/L, CHT

## 2024-03-14 ENCOUNTER — OFFICE VISIT (OUTPATIENT)
Dept: OCCUPATIONAL MEDICINE | Age: 59
End: 2024-03-14
Attending: FAMILY MEDICINE
Payer: COMMERCIAL

## 2024-03-14 PROCEDURE — 97110 THERAPEUTIC EXERCISES: CPT

## 2024-03-14 PROCEDURE — 97112 NEUROMUSCULAR REEDUCATION: CPT

## 2024-03-14 PROCEDURE — 97035 APP MDLTY 1+ULTRASOUND EA 15: CPT

## 2024-03-14 NOTE — PROGRESS NOTES
Diagnosis:   Lateral elbow tendinopathy     Arthralgia of upper arm, unspecified laterality (M25.529)  Arthralgia of shoulder, unspecified laterality (M25.519) Referring Provider: Harjeet  Date of Evaluation:    1/30/24    Precautions:  None  Next MD/PA visit: TBS  DOI: 10/2023  Date of Surgery: n/a   Insurance Primary/Secondary: [employer] / N/A     # Auth Visits: n/a             Orders:      Order Date:  1/10/2024  Authorizing Provider:   LORENA ALEMAN     Procedure:  OP REFERRAL TO [facility] [[id number]]         Order #:   [id number]  Qty:  1     Priority:  Routine                   Class:   IHP - RFL     Standing Interval:          Standing Occurrences:          Expires on:            Expected by:    Associated DX:  Arthralgia of upper arm, unspecified laterality (M25.529)  Arthralgia of shoulder, unspecified laterality (M25.519)     Order summary:  IHP - RFL, Routine, 8 visits  Occupational  Therapy Area of Concentration: Orthopedic  Occupational Therapy Ortho: UE  If the patient can be seen sooner with a PT vs an OT, can we cancel this order and replace with a PT order? No         Comments:  Assessment:   Pt tolerated PT tx well today.  Advised pt to consider OT for R elbow pain however plan to continue PT for R shoulder pain.  Progressed R shoulder exercises as tolerated.  Updated HEP.  Mm fasciculations present w/ posterior shoulder exercises in infraspinatus and posterior delt indicating impaired strength and neuromuscular control.     SUBJECTIVE:  \"Its the same, simmering on the back burner.\"      Pain: 1/10           OBJECTIVE:        OUTCOME MEASURE   (Initial Eval):  QuickDASH Outcome Score  Score: 38.64 % (1/29/2024  2:53 PM)                 Hand Strength: (measured in pounds)    Date 1/30/24 Date 1/30/24   Position Right  Left     in standard position 55 63    in stress loaded position 45 8/10 pain 65   3 point pinch 13 13   lateral pinch 17 16      Sensation: Date  1/30/24  Description of sensation: normal, but reports occasional ulnar-sided hand numbness/tingling        Provocative Tests: Date 1/30/24  (+) 7-8/10 pain with long finger resisted extension  (-) 0/10 pain with resisted wrist extension  (+) 4/10 pain with palpation to lateral epicondyle  (-) 0/10 pain with palpation to radial tunnel  (-) 0/10 pain with palpation to lateral supracondylar ridge  (-) 0/10 pain with resistance to Supinator  (-) 0/10 Pain with resistance to Anconeus      2/29/24:  (+) 3/10 pain with long finger resisted extension  (+) 3/10 pain with palpation to lateral epicondyle        Posture: slightly rounded shoulders        NECK SCREEN: reports some 'Stiffness\"              Date 2/29/24 3/5/24 3/7/24 3/12/24 3/14/24 3/18/24   Visit # 6 7 8 9 10 11   # of visits authorized:          # of visits in OT POC:  12 12 12 12 12 12   Evaluation         Progress Report written         Manual Therapy         IASTM, STM cupping cupping Cupping, flexbar rolling over soft tissues Cupping, yellow flexbar roll over lateral elbow soft tissues Cupping, yellow flexbar roll over lateral elbow soft tissues Cupping    Radial head posterior glides                   Ther ex          forearm stretches         Radial nerve glides  20x  x x x    cervical stretches         SA against ball on wall   30 seconds, 6x 30 seconds with 0.5 kg, 6x     Middle and lower traps strengthening  reviewed       SA strengthening          graded isometrics to wrist extensors FA in midposition gripping of yellow bulk putty FA in midposition gripping of yellow bulk putty FA in midposition gripping of yellow bulk putty; graded  with FA pronated, of putty FA in midposition gripping of yellow bulk putty; graded  with FA pronated, of putty FA in midposition gripping of yellow bulk putty; graded  with FA pronated, of putty FA in midposition gripping of yellow bulk putty; graded  with FA pronated, of putty    doorway stretches   reviewed       theratubing      Red, 2 sets of 10 each of 4; 2 sets of 10 ER each arm; pull-downs 1x10   Ulnar nerve glides x x       Wrist isometrics and eccentrics Place and hold with dowel mariana only, 3x, 15 second holds and lower Place and hold with yellow flexbar, 10 and 15 second holds and lowers, 4x Place and hold with yellow flexbar, 15 second holds and lowers, 6x Place and hold with 1#; 20 seconds each 3x; eccentrics with yellow flexbar 4x Place and hold with 1#; 20 seconds each 3x; eccentrics with yellow flexbar 4x Place and hold with 1#; 45 seconds each 3x; eccentrics with yellow flexbar 4x   PNF diagonals   15x bilateral, II 15x bilateral with 1# in each hand  holding red flexbar/racket simulation   SciFit    1 min each direction  3 min each direction    HEP/  Patient education topics Tape removal OT POC   Try isometrics and eccentrics at home with light handhold. OT POC, use of Tiger Tail roller Use of counterforce strap OT POC OT POC   Therapeutic Activity         Activity modification training                                                            Objective measurements taken and reviewed with patient See above        Neuromuscular Re-education          wrist proprioceptive re-ed activities Wrist proprio board, tennis ball on frisbee Lacrosse ball on game tray, unilateral; hand wrist balance board  Unilateral lacrosse ball on game tray; pertubations with yellow flexbar; True Balance Unilateral lacrosse ball on game tray; pertubations with yellow flexbar and yellow power web Pertubations of yellow flexbar and red power web                                 Orthotic fitting and training         Taping Rock tape applied I strip, proximal to distal pull along ECRB Rock tape in star over lateral elbow   Deferred       Modalities Ultrasound:  3mHz  0.8w/cm2  100%  to right lateral elbow  for 8 minutes. Ultrasound:  3mHz  1.0w/cm2  100%  to right lateral elbow  for 8 minutes.    Ultrasound:  3mHz  1.0w/cm2  100%  to right lateral elbow  for 8 minutes. Ultrasound:  3mHz  1.0w/cm2  100%  to right lateral elbow  for 8 minutes. Ultrasound:  3mHz  1.0w/cm2  100%  to right lateral elbow  for 8 minutes. Ultrasound:  3mHz  1.0w/cm2  100%  to right lateral elbow  for 8 minutes.   X= performed this date as previously outlined     HEP  On initial eval, patient education was provided on exam findings, treatment diagnosis, treatment plan, expectations, and prognosis. Patient was also provided recommendations for use of heat, counterforce strap, wrist brace, tape.  HEP:  HEP Date Issued Date modified Date discharged Comments    FA/wrist stretches 1/30/24 2/13/24 reviewed stretches versus active holds   Radial nerve glides 1/30/24         Scap add 1/30/24         Cervical stretches rotation 1/30/24         Terri scap ex 2/13/24      Doorway/corner stretches; SA with pillow case slides and standing prone on elbow position; middle and lower traps         ASSESSMENT  Patient presents with stable presentation of right lateral elbow pain. No new pain and none provoked with upgrade in therapy session today. Appears to be ready for planned dc next session.      PLAN:  Goals: (to be met in 12 visits)  Patient will be independent and compliant with comprehensive HEP to maintain progress achieved in OT. (Progressing)  Patient to verbalize and demonstrate understanding of aggravating and alleviating factors related to lateral epicondylitis to decrease risk of recurrence for improved functional use of right UE. (Progressing)  Patient will present with sufficient ROM and strength in the right hand/arm to return to work, self care, homemaking and leisure skill independent performance. (Progressing)  Patient will present with increase in force tolerance of gripping with the right hand in the \"stress position\" to 50 # of force with no more than 2/10 pain to allow for ease with gripping work tools.  (Progressing)  Patient will trial orthoses and provide feedback on their use and objectives. (Progressing)  Patient will verbalize two considerations for computer work station set up. (Progressing)        Frequency / Duration: Patient will be seen for 2 x/week or a total of 12 visits over a 90 day period.  Treatment will include: Manual Therapy, Neuromuscular Re-education, Self-Care Home Management, Therapeutic Activities, Therapeutic Exercise, Home Exercise Program instruction, Modalities to include: Ultrasound and hot packs, and taping  and custom splinting.     Next session: wrist proprioceptive re-ed, graded muscle contraction with yellow putty; 1# eccentrics, continue Sci Fit;  extended FA neutral eccentrics with yellow flexbar; transition to HEP and self management.     Charges: 1 US, 2 TE, 1 NM  Total Timed Treatment: 45 minutes                      Total Treatment Time: 45 minutes  JEREMY Bennett, OTR/L, CHT

## 2024-03-18 ENCOUNTER — OFFICE VISIT (OUTPATIENT)
Dept: OCCUPATIONAL MEDICINE | Age: 59
End: 2024-03-18
Attending: FAMILY MEDICINE
Payer: COMMERCIAL

## 2024-03-18 PROCEDURE — 97112 NEUROMUSCULAR REEDUCATION: CPT

## 2024-03-18 PROCEDURE — 97110 THERAPEUTIC EXERCISES: CPT

## 2024-03-18 PROCEDURE — 97035 APP MDLTY 1+ULTRASOUND EA 15: CPT

## 2024-03-18 NOTE — PROGRESS NOTES
Rosie  Pt has attended 12 visits in Occupational Therapy.     Diagnosis:   Lateral elbow tendinopathy     Arthralgia of upper arm, unspecified laterality (M25.529)  Arthralgia of shoulder, unspecified laterality (M25.519) Referring Provider: Harjeet  Date of Evaluation:    1/30/24    Precautions:  None  Next MD/PA visit: TBS  DOI: 10/2023  Date of Surgery: n/a   Insurance Primary/Secondary: [employer] / N/A     # Auth Visits: n/a             Orders:      Order Date:  1/10/2024  Authorizing Provider:   LORENA ALEMAN     Procedure:  OP REFERRAL TO [facility] [[id number]]         Order #:   [id number]  Qty:  1     Priority:  Routine                   Class:   IHP - RFL     Standing Interval:          Standing Occurrences:          Expires on:            Expected by:    Associated DX:  Arthralgia of upper arm, unspecified laterality (M25.529)  Arthralgia of shoulder, unspecified laterality (M25.519)     Order summary:  IHP - RFL, Routine, 8 visits  Occupational  Therapy Area of Concentration: Orthopedic  Occupational Therapy Ortho: UE  If the patient can be seen sooner with a PT vs an OT, can we cancel this order and replace with a PT order? No         Comments:  Assessment:   Pt tolerated PT tx well today.  Advised pt to consider OT for R elbow pain however plan to continue PT for R shoulder pain.  Progressed R shoulder exercises as tolerated.  Updated HEP.  Mm fasciculations present w/ posterior shoulder exercises in infraspinatus and posterior delt indicating impaired strength and neuromuscular control.     SUBJECTIVE:  C/o pain with simulation of hitting with pickleball.      Pain: 1/10 See also provocative tests below.           OBJECTIVE:        OUTCOME MEASURE   (Initial Eval):  QuickDASH Outcome Score  Score: 38.64 % (1/29/2024  2:53 PM)        Discharge  Post QuickDASH Outcome Score  Post Score: 11.36 % (3/21/2024  3:48 PM)  27.28 % improvement             Hand Strength: (measured in  pounds)    Date 1/30/24 Date 1/30/24 Right 3/21/24   Position Right  Left  right    in standard position 55 63 49, 50    in stress loaded position 45 8/10 pain 65 36 6/10 pain   3 point pinch 13 13 15   lateral pinch 17 16 18      Sensation: Date 1/30/24  Description of sensation: normal, but reports occasional ulnar-sided hand numbness/tingling        Provocative Tests: Date 1/30/24  (+) 7-8/10 pain with long finger resisted extension  (-) 0/10 pain with resisted wrist extension  (+) 4/10 pain with palpation to lateral epicondyle  (-) 0/10 pain with palpation to radial tunnel  (-) 0/10 pain with palpation to lateral supracondylar ridge  (-) 0/10 pain with resistance to Supinator  (-) 0/10 Pain with resistance to Anconeus      2/29/24:  (+) 3/10 pain with long finger resisted extension  (+) 3/10 pain with palpation to lateral epicondyle      3/22/24:  (+) 6/10 pain with LF resisted  (+) 3/10 pain with palpation to lateral epi    Posture: slightly rounded shoulders                Date 2/29/24 3/5/24 3/7/24 3/12/24 3/14/24 3/18/24 3/21/24   Visit # 6 7 8 9 10 11 12   # of visits authorized:           # of visits in OT POC:  12 12 12 12 12 12 12   Evaluation          Progress Report written          Manual Therapy          IASTM, STM cupping cupping Cupping, flexbar rolling over soft tissues Cupping, yellow flexbar roll over lateral elbow soft tissues Cupping, yellow flexbar roll over lateral elbow soft tissues Cupping  cupping     Radial head posterior glides                     Ther ex           forearm stretches          Radial nerve glides  20x  x x x x    cervical stretches          SA against ball on wall   30 seconds, 6x 30 seconds with 0.5 kg, 6x      Middle and lower traps strengthening  reviewed        SA strengthening           graded isometrics to wrist extensors FA in midposition gripping of yellow bulk putty FA in midposition gripping of yellow bulk putty FA in midposition gripping of yellow bulk  putty; graded  with FA pronated, of putty FA in midposition gripping of yellow bulk putty; graded  with FA pronated, of putty FA in midposition gripping of yellow bulk putty; graded  with FA pronated, of putty FA in midposition gripping of yellow bulk putty; graded  with FA pronated, of putty FA in midposition gripping of yellow bulk putty; graded  with FA pronated, of putty    doorway stretches  reviewed        theratubing      Red, 2 sets of 10 each of 4; 2 sets of 10 ER each arm; pull-downs 1x10    Ulnar nerve glides x x        Wrist isometrics and eccentrics Place and hold with dowel mariana only, 3x, 15 second holds and lower Place and hold with yellow flexbar, 10 and 15 second holds and lowers, 4x Place and hold with yellow flexbar, 15 second holds and lowers, 6x Place and hold with 1#; 20 seconds each 3x; eccentrics with yellow flexbar 4x Place and hold with 1#; 20 seconds each 3x; eccentrics with yellow flexbar 4x Place and hold with 1#; 45 seconds each 3x; eccentrics with yellow flexbar 4x Place and hold with 1#; 45 seconds each 3x; eccentrics with yellow flexbar 4x   PNF diagonals   15x bilateral, II 15x bilateral with 1# in each hand  holding red flexbar/racket simulation Holding pickleball racket   SciFit    1 min each direction  3 min each direction     HEP/  Patient education topics Tape removal OT POC   Try isometrics and eccentrics at home with light handhold. OT POC, use of Tiger Tail roller Use of counterforce strap OT POC OT POC    Therapeutic Activity          Activity modification training                                                                  Objective measurements taken and reviewed with patient See above         Neuromuscular Re-education           wrist proprioceptive re-ed activities Wrist proprio board, tennis ball on frisbee Lacrosse ball on game tray, unilateral; hand wrist balance board  Unilateral lacrosse ball on game tray; pertubations with yellow flexbar;  True Balance Unilateral lacrosse ball on game tray; pertubations with yellow flexbar and yellow power web Pertubations of yellow flexbar and red power web Pertubations of yellow flexbar and red power web.  Golf ball on game tray unilateral                                    Orthotic fitting and training          Taping Rock tape applied I strip, proximal to distal pull along ECRB Rock tape in star over lateral elbow   Deferred        Modalities Ultrasound:  3mHz  0.8w/cm2  100%  to right lateral elbow  for 8 minutes. Ultrasound:  3mHz  1.0w/cm2  100%  to right lateral elbow  for 8 minutes.   Ultrasound:  3mHz  1.0w/cm2  100%  to right lateral elbow  for 8 minutes. Ultrasound:  3mHz  1.0w/cm2  100%  to right lateral elbow  for 8 minutes. Ultrasound:  3mHz  1.0w/cm2  100%  to right lateral elbow  for 8 minutes. Ultrasound:  3mHz  1.0w/cm2  100%  to right lateral elbow  for 8 minutes. Ultrasound:  3mHz  1.0w/cm2  100%  to right lateral elbow  for 8 minutes.   X= performed this date as previously outlined     HEP  On initial eval, patient education was provided on exam findings, treatment diagnosis, treatment plan, expectations, and prognosis. Patient was also provided recommendations for use of heat, counterforce strap, wrist brace, tape.  HEP:  HEP Date Issued Date modified Date discharged Comments    FA/wrist stretches 1/30/24 2/13/24 reviewed stretches versus active holds   Radial nerve glides 1/30/24         Scap add 1/30/24         Cervical stretches rotation 1/30/24         Terri scap ex 2/13/24      Doorway/corner stretches; SA with pillow case slides and standing prone on elbow position; middle and lower traps         ASSESSMENT    Patient is a 57 yo female, who has been seen in Occupational Therapy since initial eval on 1/30/24, with last treatment session on 3/21/24.  The patient has attended 12/15 scheduled appointments, with 0 no shows and 3 cancellations.  Focus of skilled OT has been on pain, and soft  tissue flexibility, with use of interventions including therapeutic activities, therapeutic exercises, modalities such as US, manual therapy, adapted ADL training, neuromuscular re-ed, and taping to achieve the functional goals listed below. She has shown improvement in pain and soft tissue flexibility, with functional improvements reported as in dressing and in daily occupation engagement.  Patient rated Outcome measure of the Quick DASH indicates improvement and a normal rating.  Limitations and barriers toward progress include: persistent lateral elbow pain.  She has met the goals as noted below, reaching maximal benefit from skilled OT, and is ready for transition to home program at this time.      PLAN:  Goals: (to be met in 12 visits)  Patient will be independent and compliant with comprehensive HEP to maintain progress achieved in OT. (Met)  Patient to verbalize and demonstrate understanding of aggravating and alleviating factors related to lateral epicondylitis to decrease risk of recurrence for improved functional use of right UE. (Met)  Patient will present with sufficient ROM and strength in the right hand/arm to return to work, self care, homemaking and leisure skill independent performance. (met)  Patient will present with increase in force tolerance of gripping with the right hand in the \"stress position\" to 50 # of force with no more than 2/10 pain to allow for ease with gripping work tools. (Not met)  Patient will trial orthoses and provide feedback on their use and objectives. (met)  Patient will verbalize two considerations for computer work station set up. (met)        Discharge OT and continue with HEP.     Charges: 1 US, 2 TE, 1 NM  Total Timed Treatment: 45 minutes                      Total Treatment Time: 45 minutes  JEREMY Bennett, OTR/L, CHT

## 2024-03-20 ENCOUNTER — APPOINTMENT (OUTPATIENT)
Dept: OCCUPATIONAL MEDICINE | Age: 59
End: 2024-03-20
Payer: COMMERCIAL

## 2024-03-21 ENCOUNTER — OFFICE VISIT (OUTPATIENT)
Dept: OCCUPATIONAL MEDICINE | Age: 59
End: 2024-03-21
Attending: FAMILY MEDICINE
Payer: COMMERCIAL

## 2024-03-21 PROCEDURE — 97035 APP MDLTY 1+ULTRASOUND EA 15: CPT

## 2024-03-21 PROCEDURE — 97110 THERAPEUTIC EXERCISES: CPT

## 2024-03-21 PROCEDURE — 97112 NEUROMUSCULAR REEDUCATION: CPT

## 2024-03-29 ENCOUNTER — OFFICE VISIT (OUTPATIENT)
Dept: NEUROLOGY | Facility: CLINIC | Age: 59
End: 2024-03-29
Payer: COMMERCIAL

## 2024-03-29 VITALS
BODY MASS INDEX: 23.82 KG/M2 | SYSTOLIC BLOOD PRESSURE: 122 MMHG | WEIGHT: 150 LBS | RESPIRATION RATE: 16 BRPM | DIASTOLIC BLOOD PRESSURE: 58 MMHG | HEART RATE: 66 BPM | HEIGHT: 66.5 IN

## 2024-03-29 DIAGNOSIS — G43.709 CHRONIC MIGRAINE W/O AURA W/O STATUS MIGRAINOSUS, NOT INTRACTABLE: Primary | ICD-10-CM

## 2024-03-29 PROCEDURE — 64615 CHEMODENERV MUSC MIGRAINE: CPT | Performed by: OTHER

## 2024-03-29 NOTE — PROGRESS NOTES
NEUROLOGY  [facility]     3/29/2024  CHRONIC MIGRAINE - BOTOX Injection  CPT: 90883    Viviana Monzon is a(n) 58 year old female with chronic migraine.  Patient is here for Botox injection.      ( x ) Headaches are frequent and based on screening procedures, satisfies criteria of chronic migraine:  >15 days a month, each occurrence can be > 4 hours duration.   Note is made that she has tried 2 or more prophylactic treatment in the past and has not responded that is why we are using Botox.      ( x ) Headaches have responded well to previous Botox injection.   Follow up injection necessary because headaches are recurring.  Patient is likewise familiar with the risks. These were reviewed and patient requests to proceed.    ROS:  No additional issues added after completing 10 point ROS      Current Outpatient Medications:     ubrogepant (UBRELVY) 100 MG Oral Tab, Take one tablet at onset of migraine.  May take additional tablet in 2 hours if needed.  Do not exceed two tablets per 24 hour period., Disp: 10 tablet, Rfl: 5    SUMAtriptan Succinate 100 MG Oral Tab, TAKE 1 TABLET BY MOUTH AS NEEDED FOR HEADACHE , MAY REPEAT ONCE IN 2 HOURS FOR PERSISTENT HEADACHE ., Disp: 27 tablet, Rfl: 3    ondansetron (ZOFRAN ODT) 4 MG Oral Tablet Dispersible, Take 1 tablet (4 mg total) by mouth every 8 (eight) hours as needed for Nausea., Disp: 30 tablet, Rfl: 5    traMADol 50 MG Oral Tab, Take 1 tablet (50 mg total) by mouth every 8 (eight) hours as needed for Pain., Disp: 60 tablet, Rfl: 0      /58 (BP Location: Left arm, Patient Position: Sitting, Cuff Size: adult)   Pulse 66   Resp 16   Ht 66.5\"   Wt 150 lb (68 kg)   LMP 08/10/2018   BMI 23.85 kg/m²   HENT:  Pink conjunctiva, anicteric sclerae, moist mucosa  Neck: No adenopathy or thyromegaly  Heart S1S2, no murmur  Lungs are clear, no wheezing  Extremities: no cyanosis or edema      PROCEDURE:  Assisted by: CMA or RN in room  BOTOX injection for  chronic migraine:  Using alcohol prep, fixed site protocol performed.  Botox was reconstituted to the following concentration:  5 units per 0.1 ml.      Muscles, number of sites, units used and Side injected were as follows:                                                 Units used     Side  Procerus   5 units        center  Corrugators 2 sites  10 units      R/L  Frontalis   4 sites  20 units      R/L  Temporalis   4 sites each side  40 units      R/L  Occipitalis:            3 sites each side             30 units      R/L        Cervical paraspinals   2 sites each side 20 units      R/L                                  TOTAL       125 units  Discarded:  75 units          IMPRESSION:  1. Chronic migraine w/o aura w/o status migrainosus, not intractable        Post injections instructions:  Expect response to start on the second or going into the 3rd week  Use ice packs and Tylenol ES if with pain at injections sites  Report any signs of infection or inflammation at site of injection  Use migraine medications the same was as before    After procedure check out process by PSRs and rooming RN/MA who were present during the procedure to assist.        Joshua Dyer MD  Vascular & General Neurology  [facility]

## 2024-03-29 NOTE — PATIENT INSTRUCTIONS
Refill policies:    Allow 2-3 business days for refills; controlled substances may take longer.  Contact your pharmacy at least 5 days prior to running out of medication and have them send an electronic request or submit request through the “request refill” option in your [employer] account.  Refills are not addressed on weekends; covering physicians do not authorize routine medications on weekends.  No narcotics or controlled substances are refilled after noon on Fridays or by on call physicians.  By law, narcotics must be electronically prescribed.  A 30 day supply with no refills is the maximum allowed.  If your prescription is due for a refill, you may be due for a follow up appointment.  To best provide you care, patients receiving routine medications need to be seen at least once a year.  Patients receiving narcotic/controlled substance medications need to be seen at least once every 3 months.  In the event that your preferred pharmacy does not have the requested medication in stock (e.g. Backordered), it is your responsibility to find another pharmacy that has the requested medication available.  We will gladly send a new prescription to that pharmacy at your request.    Scheduling Tests:    If your physician has ordered radiology tests such as MRI or CT scans, please contact Central Scheduling at [phone] right away to schedule the test.  Once scheduled, the [facility] Centralized Referral Team will work with your insurance carrier to obtain pre-certification or prior authorization.  Depending on your insurance carrier, approval may take 3-10 days.  It is highly recommended patients assure they have received an authorization before having a test performed.  If test is done without insurance authorization, patient may be responsible for the entire amount billed.      Precertification and Prior Authorizations:  If your physician has recommended that you have a procedure or additional testing performed the [facility]  Centralized Referral Team will contact your insurance carrier to obtain pre-certification or prior authorization.    You are strongly encouraged to contact your insurance carrier to verify that your procedure/test has been approved and is a COVERED benefit.  Although the [facility] Centralized Referral Team does its due diligence, the insurance carrier gives the disclaimer that \"Although the procedure is authorized, this does not guarantee payment.\"    Ultimately the patient is responsible for payment.   Thank you for your understanding in this matter.  Paperwork Completion:  If you require FMLA or disability paperwork for your recovery, please make sure to either drop it off or have it faxed to our office at [phone]. Be sure the form has your name and date of birth on it.  The form will be faxed to our Forms Department and they will complete it for you.  There is a 25$ fee for all forms that need to be filled out.  Please be aware there is a 10-14 day turnaround time.  You will need to sign a release of information (NANCY) form if your paperwork does not come with one.  You may call the Forms Department with any questions at [phone].  Their fax number is [phone].

## 2024-03-29 NOTE — PROGRESS NOTES
Paperwork noting that patient may bear financial responsibility for procedure(s) performed in clinic today signed prior to proceeding with procedure(s).    Furthermore, patient notified that they should contact their insurer to verify that your procedure/test has been approved and is a COVERED benefit.  Although the [facility] staff does its due diligence, the insurance carrier gives the disclaimer that \"Although the procedure is authorized, this does not guarantee payment.\"    Ultimately the patient is responsible for payment.    Botox is:  [x] Buy and Bill  [] Patient Supplied      Botox Reauthorization Questions:  Has the patient experienced a reduction in frequency of migraines since starting Botox? yes  If yes, by what percentage? 50%  Has the intensity of migraines decreased since starting Botox? yes  If yes, by what percentage? 50%

## 2024-04-24 ENCOUNTER — LAB ENCOUNTER (OUTPATIENT)
Dept: LAB | Age: 59
End: 2024-04-24
Attending: FAMILY MEDICINE
Payer: COMMERCIAL

## 2024-04-24 DIAGNOSIS — Z00.00 ROUTINE GENERAL MEDICAL EXAMINATION AT A HEALTH CARE FACILITY: ICD-10-CM

## 2024-04-24 LAB
ALBUMIN SERPL-MCNC: 3.6 G/DL (ref 3.4–5)
ALBUMIN/GLOB SERPL: 1 {RATIO} (ref 1–2)
ALP LIVER SERPL-CCNC: 38 U/L
ALT SERPL-CCNC: 24 U/L
ANION GAP SERPL CALC-SCNC: 6 MMOL/L (ref 0–18)
AST SERPL-CCNC: 14 U/L (ref 15–37)
BILIRUB SERPL-MCNC: 0.6 MG/DL (ref 0.1–2)
BUN BLD-MCNC: 11 MG/DL (ref 9–23)
CALCIUM BLD-MCNC: 8.9 MG/DL (ref 8.5–10.1)
CHLORIDE SERPL-SCNC: 108 MMOL/L (ref 98–112)
CHOLEST SERPL-MCNC: 249 MG/DL (ref ?–200)
CO2 SERPL-SCNC: 27 MMOL/L (ref 21–32)
CREAT BLD-MCNC: 0.94 MG/DL
DEPRECATED HBV CORE AB SER IA-ACNC: 30.1 NG/ML
EGFRCR SERPLBLD CKD-EPI 2021: 70 ML/MIN/1.73M2 (ref 60–?)
ERYTHROCYTE [DISTWIDTH] IN BLOOD BY AUTOMATED COUNT: 13.2 %
FASTING PATIENT LIPID ANSWER: YES
FASTING STATUS PATIENT QL REPORTED: YES
GLOBULIN PLAS-MCNC: 3.5 G/DL (ref 2.8–4.4)
GLUCOSE BLD-MCNC: 97 MG/DL (ref 70–99)
HCT VFR BLD AUTO: 37.7 %
HDLC SERPL-MCNC: 89 MG/DL (ref 40–59)
HGB BLD-MCNC: 12.5 G/DL
LDLC SERPL CALC-MCNC: 152 MG/DL (ref ?–100)
MCH RBC QN AUTO: 30.3 PG (ref 26–34)
MCHC RBC AUTO-ENTMCNC: 33.2 G/DL (ref 31–37)
MCV RBC AUTO: 91.3 FL
NONHDLC SERPL-MCNC: 160 MG/DL (ref ?–130)
OSMOLALITY SERPL CALC.SUM OF ELEC: 291 MOSM/KG (ref 275–295)
PLATELET # BLD AUTO: 183 10(3)UL (ref 150–450)
POTASSIUM SERPL-SCNC: 4 MMOL/L (ref 3.5–5.1)
PROT SERPL-MCNC: 7.1 G/DL (ref 6.4–8.2)
RBC # BLD AUTO: 4.13 X10(6)UL
SODIUM SERPL-SCNC: 141 MMOL/L (ref 136–145)
TRIGL SERPL-MCNC: 53 MG/DL (ref 30–149)
TSI SER-ACNC: 2.84 MIU/ML (ref 0.36–3.74)
VIT D+METAB SERPL-MCNC: 30.4 NG/ML (ref 30–100)
VLDLC SERPL CALC-MCNC: 10 MG/DL (ref 0–30)
WBC # BLD AUTO: 4.5 X10(3) UL (ref 4–11)

## 2024-04-24 PROCEDURE — 84443 ASSAY THYROID STIM HORMONE: CPT

## 2024-04-24 PROCEDURE — 36415 COLL VENOUS BLD VENIPUNCTURE: CPT

## 2024-04-24 PROCEDURE — 85027 COMPLETE CBC AUTOMATED: CPT

## 2024-04-24 PROCEDURE — 82728 ASSAY OF FERRITIN: CPT

## 2024-04-24 PROCEDURE — 80053 COMPREHEN METABOLIC PANEL: CPT

## 2024-04-24 PROCEDURE — 80061 LIPID PANEL: CPT

## 2024-04-24 PROCEDURE — 82306 VITAMIN D 25 HYDROXY: CPT

## 2024-05-16 ENCOUNTER — OFFICE VISIT (OUTPATIENT)
Dept: OTHER | Facility: HOSPITAL | Age: 59
End: 2024-05-16
Attending: PREVENTIVE MEDICINE

## 2024-06-14 ENCOUNTER — OFFICE VISIT (OUTPATIENT)
Dept: FAMILY MEDICINE CLINIC | Facility: CLINIC | Age: 59
End: 2024-06-14
Payer: COMMERCIAL

## 2024-06-14 ENCOUNTER — LAB ENCOUNTER (OUTPATIENT)
Dept: LAB | Age: 59
End: 2024-06-14
Attending: FAMILY MEDICINE
Payer: COMMERCIAL

## 2024-06-14 VITALS
HEIGHT: 65 IN | BODY MASS INDEX: 25.43 KG/M2 | HEART RATE: 58 BPM | TEMPERATURE: 97 F | DIASTOLIC BLOOD PRESSURE: 60 MMHG | SYSTOLIC BLOOD PRESSURE: 110 MMHG | RESPIRATION RATE: 16 BRPM | WEIGHT: 152.63 LBS

## 2024-06-14 DIAGNOSIS — Z12.31 VISIT FOR SCREENING MAMMOGRAM: ICD-10-CM

## 2024-06-14 DIAGNOSIS — R10.2 PELVIC PAIN: ICD-10-CM

## 2024-06-14 DIAGNOSIS — Z00.00 ROUTINE GENERAL MEDICAL EXAMINATION AT A HEALTH CARE FACILITY: Primary | ICD-10-CM

## 2024-06-14 DIAGNOSIS — M25.50 ARTHRALGIA, UNSPECIFIED JOINT: ICD-10-CM

## 2024-06-14 LAB
ERYTHROCYTE [SEDIMENTATION RATE] IN BLOOD: 6 MM/HR
RHEUMATOID FACT SERPL-ACNC: <10 IU/ML (ref ?–15)

## 2024-06-14 PROCEDURE — 36415 COLL VENOUS BLD VENIPUNCTURE: CPT

## 2024-06-14 PROCEDURE — 86431 RHEUMATOID FACTOR QUANT: CPT

## 2024-06-14 PROCEDURE — 99396 PREV VISIT EST AGE 40-64: CPT | Performed by: FAMILY MEDICINE

## 2024-06-14 PROCEDURE — 85652 RBC SED RATE AUTOMATED: CPT

## 2024-06-14 RX ORDER — ESTRADIOL 0.1 MG/G
CREAM VAGINAL
Qty: 42.5 G | Refills: 5 | Status: SHIPPED | OUTPATIENT
Start: 2024-06-14

## 2024-06-14 RX ORDER — AZITHROMYCIN 500 MG/1
500 TABLET, FILM COATED ORAL DAILY
Qty: 3 TABLET | Refills: 0 | Status: SHIPPED | OUTPATIENT
Start: 2024-06-14 | End: 2024-06-17

## 2024-06-14 NOTE — PROGRESS NOTES
HPI:   Viviana Monzon is a 58 year old female who presents for a complete physical exam.  Last pap:  S/p hyst age 53, preserved ovaries  Last mammogram:  5/2023  Previous colonoscopy:  2/2023 - repeat in 3 years.   Previous DEXA:  /.  Current or previous treatment:  /  Family hx of breast, ovarian, cervical or colon CA:  Mom breast  Immunizations:  Tdap:  Td 5/2022, Flu:  yearly, Prevnar:  /, Shingles:  2/2022, 9/2022   with 2 boys - one considering PA or med school; the other .     Lipids:  , up from last year.   Heart scan 0 7/2022.  Consider repeating in 3-5 years.     Migraines:  seeing neuro    Vitamin D def:  Takes supplement    Traveling to [place], needs azithromycin for travelers diarrhea.     Diarrhea after travel, now improved.  Some pelvic pain - plan for US    Vaginal dryness:  pain with intercourse.  Plan vaginal estradiol.     Generalized joint pain:  concerned about RA    Wt Readings from Last 6 Encounters:   06/14/24 152 lb 9.6 oz (69.2 kg)   03/29/24 150 lb (68 kg)   12/29/23 150 lb (68 kg)   09/27/23 150 lb (68 kg)   06/14/23 150 lb (68 kg)   04/25/23 154 lb 3.2 oz (69.9 kg)     Body mass index is 25.39 kg/m².     Cholesterol, Total (mg/dL)   Date Value   04/24/2024 249 (H)   04/17/2023 240 (H)   04/05/2022 246 (H)     Cholesterol (mg/dL)   Date Value   11/11/2019 230.00 (H)   11/16/2018 226 (H)     HDL Cholesterol (mg/dL)   Date Value   04/24/2024 89 (H)   04/17/2023 87 (H)   04/05/2022 87 (H)     Direct HDL (mg/dL)   Date Value   11/11/2019 83   11/16/2018 90     LDL Cholesterol (mg/dL)   Date Value   04/24/2024 152 (H)   04/17/2023 140 (H)   04/05/2022 146 (H)     Calculated LDL (mg/dL)   Date Value   11/11/2019 134 (H)   11/16/2018 128        Current Outpatient Medications   Medication Sig Dispense Refill    azithromycin 500 MG Oral Tab Take 1 tablet (500 mg total) by mouth daily for 3 days. 3 tablet 0    estradiol (ESTRACE) 0.1 MG/GM Vaginal Cream 2gm vaginally  daily x 2 weeks, then 1gm twice weekly 42.5 g 5    ubrogepant (UBRELVY) 100 MG Oral Tab Take one tablet at onset of migraine.  May take additional tablet in 2 hours if needed.  Do not exceed two tablets per 24 hour period. 10 tablet 5    SUMAtriptan Succinate 100 MG Oral Tab TAKE 1 TABLET BY MOUTH AS NEEDED FOR HEADACHE , MAY REPEAT ONCE IN 2 HOURS FOR PERSISTENT HEADACHE . 27 tablet 3    ondansetron (ZOFRAN ODT) 4 MG Oral Tablet Dispersible Take 1 tablet (4 mg total) by mouth every 8 (eight) hours as needed for Nausea. 30 tablet 5    traMADol 50 MG Oral Tab Take 1 tablet (50 mg total) by mouth every 8 (eight) hours as needed for Pain. 60 tablet 0      Patient Active Problem List   Diagnosis    Dense breasts    Migraine    PFO (patent foramen ovale) (HCC)    S/P laparoscopic hysterectomy    Pure hypercholesterolemia    Benign neoplasm of cecum    Benign neoplasm of transverse colon     Past Medical History:    Anemia    Always seem to be low iron    Anxiety    Recent Anxiety attack- May 2022    Arthritis    Bloating    occasionally- when I eat dairy    Constipation    Occasionally when I eat dairy or take Iron supplements    Easy bruising    Food intolerance    I avoid gluten and dairy- more regular bowel movements .    Headache disorder    History of Migraines    Heavy menses    Had Fibroids. Hysterectomy     High cholesterol    Migraines    Night sweats    Wears glasses    Weight gain    Gained 5-10 lbs      Past Surgical History:   Procedure Laterality Date    Colonoscopy  2015    Repeat in 10 years    Hysterectomy                x2    Total abdom hysterectomy  2018      Family History   Problem Relation Age of Onset    Breast Cancer Mother 86    High Blood Pressure Mother     Cancer Mother         pancreatic    Cancer Father         esophageal     High Cholesterol Father     Heart Disease Father         CABG    Prostate Cancer Father             Heart Attack Father     Stroke  Maternal Grandfather     Stroke Paternal Grandmother     Stroke Paternal Grandfather       Social History:   Social History     Socioeconomic History    Marital status:    Tobacco Use    Smoking status: Never     Passive exposure: Never    Smokeless tobacco: Never   Vaping Use    Vaping status: Never Used   Substance and Sexual Activity    Alcohol use: Yes     Alcohol/week: 1.0 standard drink of alcohol     Types: 1 Shots of liquor per week    Drug use: No   Other Topics Concern    Caffeine Concern Yes     Comment: 1 cupa and 1 ice tea    Exercise Yes     Comment: 5 x a week    Self-Exams Yes        REVIEW OF SYSTEMS:   GENERAL: feels well otherwise  LUNGS: denies shortness of breath  CARDIOVASCULAR: denies chest pain  GI: denies abdominal pain,  No constipation or diarrhea  : denies dysuria, vaginal discharge or itching    EXAM:   /60   Pulse 58   Temp 96.6 °F (35.9 °C)   Resp 16   Ht 5' 5\" (1.651 m)   Wt 152 lb 9.6 oz (69.2 kg)   LMP 08/10/2018   BMI 25.39 kg/m²   Body mass index is 25.39 kg/m².   GENERAL: well developed, well nourished,in no apparent distress  HEENT: atraumatic, normocephalic, TMs normal  EYES:PERRLA, EOMI,conjunctiva are clear  NECK: supple,no adenopathy, no thyromegaly  BREAST: BREASTS:  Breasts are symmetrical.    Right breast: The skin, nipple ,and areola appear normal. No nipple inversion.  No nipple discharge. Tissue is mildly nodular. There are no dominant masses.  No palpable abnormality in the axilla.   Left breast:   The skin, nipple ,and areola appear normal. No nipple inversion.  No nipple discharge. Tissue is mildly nodular. There are no dominant masses.  No palpable abnormality in the axilla.   LUNGS: clear to auscultation  CARDIO: RRR without murmur  GI: good BS's,no masses, HSM or tenderness  :/  EXTREMITIES: no edema    ASSESSMENT AND PLAN:   Viviana Monzon is a 58 year old female who presents for a complete physical exam.  Encounter Diagnoses   Name  Primary?    Routine general medical examination at a health care facility Yes    Arthralgia, unspecified joint     Pelvic pain     Visit for screening mammogram      Pap:  N/a  Mammogram:  yearly.    Dexascan:  /.  Labs:  Reviewed.  Check yearly.  Monitor lipids, heart scan in next 1-3 years.   Colonoscopy:  Due   Vaccines:  /  Recommended low fat diet and regular exercise.    The patient is asked to return for CPX in 1 year.      2. Arthralgia, unspecified joint  - Rheumatoid Arthritis Factor [E]; Future  - Sed Artem Chris (Automated) [E]; Future    3. Pelvic pain  - US PELVIS W EV (CPT=76856/70190); Future    4. Visit for screening mammogram  - [facility] GRACE 2D+3D SCREENING BILAT (CPT=77067/30912); Future      Orders Placed This Encounter   Procedures    Rheumatoid Arthritis Factor [E]    Sed RateArtem (Automated) [E]       Meds & Refills for this Visit:  Requested Prescriptions     Signed Prescriptions Disp Refills    azithromycin 500 MG Oral Tab 3 tablet 0     Sig: Take 1 tablet (500 mg total) by mouth daily for 3 days.    estradiol (ESTRACE) 0.1 MG/GM Vaginal Cream 42.5 g 5     Sigm vaginally daily x 2 weeks, then 1gm twice weekly       Imaging & Consults:  US PELVIS W EV (CPT=76856/34463)  [facility] GRACE 2D+3D SCREENING BILAT (CPT=77067/87263)

## 2024-06-20 ENCOUNTER — HOSPITAL ENCOUNTER (OUTPATIENT)
Dept: MAMMOGRAPHY | Facility: HOSPITAL | Age: 59
Discharge: HOME | End: 2024-06-20
Attending: FAMILY MEDICINE

## 2024-06-20 DIAGNOSIS — Z12.31 VISIT FOR SCREENING MAMMOGRAM: ICD-10-CM

## 2024-06-20 PROCEDURE — 77063 BREAST TOMOSYNTHESIS BI: CPT | Performed by: FAMILY MEDICINE

## 2024-06-20 PROCEDURE — 77067 SCR MAMMO BI INCL CAD: CPT | Performed by: FAMILY MEDICINE

## 2024-06-28 ENCOUNTER — HOSPITAL ENCOUNTER (OUTPATIENT)
Dept: MAMMOGRAPHY | Facility: HOSPITAL | Age: 59
Discharge: HOME | End: 2024-06-28
Attending: FAMILY MEDICINE
Payer: COMMERCIAL

## 2024-06-28 DIAGNOSIS — R92.2 INCONCLUSIVE MAMMOGRAM: ICD-10-CM

## 2024-06-28 PROCEDURE — 77061 BREAST TOMOSYNTHESIS UNI: CPT | Performed by: FAMILY MEDICINE

## 2024-06-28 PROCEDURE — 77065 DX MAMMO INCL CAD UNI: CPT | Performed by: FAMILY MEDICINE

## 2024-06-28 PROCEDURE — 76642 ULTRASOUND BREAST LIMITED: CPT | Performed by: FAMILY MEDICINE

## 2024-07-01 ENCOUNTER — TELEPHONE (OUTPATIENT)
Dept: NEUROLOGY | Facility: CLINIC | Age: 59
End: 2024-07-01

## 2024-07-02 NOTE — TELEPHONE ENCOUNTER
Received fax from [employer]   Approval received for patient use of Ubrelvy   Approval granted: 6/2/24-6/2/25        Pt notified

## 2024-07-09 ENCOUNTER — HOSPITAL ENCOUNTER (OUTPATIENT)
Dept: ULTRASOUND IMAGING | Age: 59
Discharge: HOME | End: 2024-07-09
Attending: FAMILY MEDICINE
Payer: COMMERCIAL

## 2024-07-09 DIAGNOSIS — R10.2 PELVIC PAIN: ICD-10-CM

## 2024-07-09 PROCEDURE — 76856 US EXAM PELVIC COMPLETE: CPT | Performed by: FAMILY MEDICINE

## 2024-07-09 PROCEDURE — 76830 TRANSVAGINAL US NON-OB: CPT | Performed by: FAMILY MEDICINE

## 2024-07-15 ENCOUNTER — OFFICE VISIT (OUTPATIENT)
Dept: NEUROLOGY | Facility: CLINIC | Age: 59
End: 2024-07-15
Payer: COMMERCIAL

## 2024-07-15 VITALS
HEART RATE: 72 BPM | RESPIRATION RATE: 16 BRPM | WEIGHT: 152 LBS | HEIGHT: 65 IN | DIASTOLIC BLOOD PRESSURE: 62 MMHG | SYSTOLIC BLOOD PRESSURE: 116 MMHG | BODY MASS INDEX: 25.33 KG/M2

## 2024-07-15 DIAGNOSIS — G43.709 CHRONIC MIGRAINE W/O AURA W/O STATUS MIGRAINOSUS, NOT INTRACTABLE: Primary | ICD-10-CM

## 2024-07-15 RX ORDER — ONDANSETRON 4 MG/1
4 TABLET, ORALLY DISINTEGRATING ORAL EVERY 8 HOURS PRN
Qty: 30 TABLET | Refills: 5 | Status: SHIPPED | OUTPATIENT
Start: 2024-07-15

## 2024-07-15 RX ORDER — UBROGEPANT 100 MG/1
TABLET ORAL
Qty: 10 TABLET | Refills: 5 | Status: SHIPPED | OUTPATIENT
Start: 2024-07-15 | End: 2025-07-15

## 2024-07-15 RX ORDER — TRAMADOL HYDROCHLORIDE 50 MG/1
50 TABLET ORAL EVERY 8 HOURS PRN
Qty: 60 TABLET | Refills: 0 | Status: SHIPPED | OUTPATIENT
Start: 2024-07-15

## 2024-07-15 NOTE — PROGRESS NOTES
NEUROLOGY  [facility]     7/15/2024  CHRONIC MIGRAINE - BOTOX Injection  CPT: 02730    Viviana Monzon is a(n) 59 year old female with chronic migraine.  Patient is here for Botox injection.      ( x ) Headaches are frequent and based on screening procedures, satisfies criteria of chronic migraine:  >15 days a month, each occurrence can be > 4 hours duration.   Note is made that she has tried 2 or more prophylactic treatment in the past and has not responded that is why we are using Botox.      ( x ) Headaches have responded well to previous Botox injection.   Follow up injection necessary because headaches are recurring.  Patient is likewise familiar with the risks. These were reviewed and patient requests to proceed.    ROS:  No additional issues added after completing 10 point ROS      Current Outpatient Medications:     ubrogepant (UBRELVY) 100 MG Oral Tab, Take one tablet at onset of migraine.  May take additional tablet in 2 hours if needed.  Do not exceed two tablets per 24 hour period., Disp: 10 tablet, Rfl: 5    traMADol 50 MG Oral Tab, Take 1 tablet (50 mg total) by mouth every 8 (eight) hours as needed for Pain., Disp: 60 tablet, Rfl: 0    ondansetron (ZOFRAN ODT) 4 MG Oral Tablet Dispersible, Take 1 tablet (4 mg total) by mouth every 8 (eight) hours as needed for Nausea., Disp: 30 tablet, Rfl: 5    estradiol (ESTRACE) 0.1 MG/GM Vaginal Cream, 2gm vaginally daily x 2 weeks, then 1gm twice weekly, Disp: 42.5 g, Rfl: 5    SUMAtriptan Succinate 100 MG Oral Tab, TAKE 1 TABLET BY MOUTH AS NEEDED FOR HEADACHE , MAY REPEAT ONCE IN 2 HOURS FOR PERSISTENT HEADACHE ., Disp: 27 tablet, Rfl: 3      /62 (BP Location: Left arm, Patient Position: Sitting, Cuff Size: adult)   Pulse 72   Resp 16   Ht 65\"   Wt 152 lb (68.9 kg)   LMP 08/10/2018   BMI 25.29 kg/m²   HENT:  Pink conjunctiva, anicteric sclerae, moist mucosa  Neck: No adenopathy or thyromegaly  Heart S1S2, no murmur  Lungs are  clear, no wheezing  Extremities: no cyanosis or edema      PROCEDURE:  Assisted by: CMA or RN in room  BOTOX injection for chronic migraine:  Using alcohol prep, fixed site protocol performed.  Botox was reconstituted to the following concentration:  5 units per 0.1 ml.      Muscles, number of sites, units used and Side injected were as follows:                                                 Units used     Side  Procerus   5 units        center  Corrugators 2 sites  10 units      R/L  Frontalis   4 sites  20 units      R/L  Temporalis   4 sites each side  40 units      R/L        Lateral frontalis    2 sites each side 20 units      R/L  Declined having the back of head and neck done                                    TOTAL       95 units  Discarded:  105 units          IMPRESSION:  1. Chronic migraine w/o aura w/o status migrainosus, not intractable        Post injections instructions:  Expect response to start on the second or going into the 3rd week  Use ice packs and Tylenol ES if with pain at injections sites  Report any signs of infection or inflammation at site of injection  Use migraine medications the same was as before    After procedure check out process by PSRs and rooming RN/MA who were present during the procedure to assist.        Joshua Dyer MD  Vascular & General Neurology  [facility]

## 2024-07-15 NOTE — PATIENT INSTRUCTIONS
Refill policies:    Allow 2-3 business days for refills; controlled substances may take longer.  Contact your pharmacy at least 5 days prior to running out of medication and have them send an electronic request or submit request through the “request refill” option in your [employer] account.  Refills are not addressed on weekends; covering physicians do not authorize routine medications on weekends.  No narcotics or controlled substances are refilled after noon on Fridays or by on call physicians.  By law, narcotics must be electronically prescribed.  A 30 day supply with no refills is the maximum allowed.  If your prescription is due for a refill, you may be due for a follow up appointment.  To best provide you care, patients receiving routine medications need to be seen at least once a year.  Patients receiving narcotic/controlled substance medications need to be seen at least once every 3 months.  In the event that your preferred pharmacy does not have the requested medication in stock (e.g. Backordered), it is your responsibility to find another pharmacy that has the requested medication available.  We will gladly send a new prescription to that pharmacy at your request.    Scheduling Tests:    If your physician has ordered radiology tests such as MRI or CT scans, please contact Central Scheduling at [phone] right away to schedule the test.  Once scheduled, the [facility] Centralized Referral Team will work with your insurance carrier to obtain pre-certification or prior authorization.  Depending on your insurance carrier, approval may take 3-10 days.  It is highly recommended patients assure they have received an authorization before having a test performed.  If test is done without insurance authorization, patient may be responsible for the entire amount billed.      Precertification and Prior Authorizations:  If your physician has recommended that you have a procedure or additional testing performed the [facility]  Centralized Referral Team will contact your insurance carrier to obtain pre-certification or prior authorization.    You are strongly encouraged to contact your insurance carrier to verify that your procedure/test has been approved and is a COVERED benefit.  Although the [facility] Centralized Referral Team does its due diligence, the insurance carrier gives the disclaimer that \"Although the procedure is authorized, this does not guarantee payment.\"    Ultimately the patient is responsible for payment.   Thank you for your understanding in this matter.  Paperwork Completion:  If you require FMLA or disability paperwork for your recovery, please make sure to either drop it off or have it faxed to our office at [phone]. Be sure the form has your name and date of birth on it.  The form will be faxed to our Forms Department and they will complete it for you.  There is a 25$ fee for all forms that need to be filled out.  Please be aware there is a 10-14 day turnaround time.  You will need to sign a release of information (NANCY) form if your paperwork does not come with one.  You may call the Forms Department with any questions at [phone].  Their fax number is [phone].

## 2024-07-15 NOTE — PROGRESS NOTES
Paperwork noting that patient may bear financial responsibility for procedure(s) performed in clinic today signed prior to proceeding with procedure(s).    Furthermore, patient notified that they should contact their insurer to verify that your procedure/test has been approved and is a COVERED benefit.  Although the [facility] staff does its due diligence, the insurance carrier gives the disclaimer that \"Although the procedure is authorized, this does not guarantee payment.\"    Ultimately the patient is responsible for payment.    Botox is:  [x] Buy and Bill  [] Patient Supplied      Botox Reauthorization Questions:  Has the patient experienced a reduction in frequency of migraines since starting Botox? yes  If yes, by what percentage? 50%  Has the intensity of migraines decreased since starting Botox? yes  If yes, by what percentage? 50%    [x] I have discussed with patient that if their insurance changes they must contact the office right away with that information so that a new prior authorization can be completed.  Patient verbalized understanding that Botox cannot be performed without a current prior authorization in place with correct insurance.

## 2024-10-15 ENCOUNTER — OFFICE VISIT (OUTPATIENT)
Dept: NEUROLOGY | Facility: CLINIC | Age: 59
End: 2024-10-15
Payer: COMMERCIAL

## 2024-10-15 VITALS
DIASTOLIC BLOOD PRESSURE: 72 MMHG | HEART RATE: 61 BPM | WEIGHT: 152 LBS | HEIGHT: 65 IN | BODY MASS INDEX: 25.33 KG/M2 | RESPIRATION RATE: 16 BRPM | SYSTOLIC BLOOD PRESSURE: 100 MMHG

## 2024-10-15 DIAGNOSIS — G43.709 CHRONIC MIGRAINE W/O AURA W/O STATUS MIGRAINOSUS, NOT INTRACTABLE: Primary | ICD-10-CM

## 2024-10-15 PROCEDURE — 64615 CHEMODENERV MUSC MIGRAINE: CPT | Performed by: OTHER

## 2024-10-15 NOTE — PROGRESS NOTES
Paperwork noting that patient may bear financial responsibility for procedure(s) performed in clinic today signed prior to proceeding with procedure(s).    Furthermore, patient notified that they should contact their insurer to verify that your procedure/test has been approved and is a COVERED benefit.  Although the [facility] staff does its due diligence, the insurance carrier gives the disclaimer that \"Although the procedure is authorized, this does not guarantee payment.\"    Ultimately the patient is responsible for payment.    Botox is:  [x] Buy and Bill  [] Patient Supplied      Botox Reauthorization Questions:  Has the patient experienced a reduction in frequency of migraines since starting Botox? no  If yes, by what percentage? 0%  Has the intensity of migraines decreased since starting Botox? yes  If yes, by what percentage? 75%    [x] I have discussed with patient that if their insurance changes they must contact the office right away with that information so that a new prior authorization can be completed.  Patient verbalized understanding that Botox cannot be performed without a current prior authorization in place with correct insurance.

## 2024-10-15 NOTE — PATIENT INSTRUCTIONS
Refill policies:    Allow 2-3 business days for refills; controlled substances may take longer.  Contact your pharmacy at least 5 days prior to running out of medication and have them send an electronic request or submit request through the “request refill” option in your [employer] account.  Refills are not addressed on weekends; covering physicians do not authorize routine medications on weekends.  No narcotics or controlled substances are refilled after noon on Fridays or by on call physicians.  By law, narcotics must be electronically prescribed.  A 30 day supply with no refills is the maximum allowed.  If your prescription is due for a refill, you may be due for a follow up appointment.  To best provide you care, patients receiving routine medications need to be seen at least once a year.  Patients receiving narcotic/controlled substance medications need to be seen at least once every 3 months.  In the event that your preferred pharmacy does not have the requested medication in stock (e.g. Backordered), it is your responsibility to find another pharmacy that has the requested medication available.  We will gladly send a new prescription to that pharmacy at your request.    Scheduling Tests:    If your physician has ordered radiology tests such as MRI or CT scans, please contact Central Scheduling at [phone] right away to schedule the test.  Once scheduled, the [facility] Centralized Referral Team will work with your insurance carrier to obtain pre-certification or prior authorization.  Depending on your insurance carrier, approval may take 3-10 days.  It is highly recommended patients assure they have received an authorization before having a test performed.  If test is done without insurance authorization, patient may be responsible for the entire amount billed.      Precertification and Prior Authorizations:  If your physician has recommended that you have a procedure or additional testing performed the [facility]  Centralized Referral Team will contact your insurance carrier to obtain pre-certification or prior authorization.    You are strongly encouraged to contact your insurance carrier to verify that your procedure/test has been approved and is a COVERED benefit.  Although the [facility] Centralized Referral Team does its due diligence, the insurance carrier gives the disclaimer that \"Although the procedure is authorized, this does not guarantee payment.\"    Ultimately the patient is responsible for payment.   Thank you for your understanding in this matter.  Paperwork Completion:  If you require FMLA or disability paperwork for your recovery, please make sure to either drop it off or have it faxed to our office at [phone]. Be sure the form has your name and date of birth on it.  The form will be faxed to our Forms Department and they will complete it for you.  There is a 25$ fee for all forms that need to be filled out.  Please be aware there is a 10-14 day turnaround time.  You will need to sign a release of information (NANCY) form if your paperwork does not come with one.  You may call the Forms Department with any questions at [phone].  Their fax number is [phone].

## 2024-10-15 NOTE — PROGRESS NOTES
NEUROLOGY  [facility]     10/15/2024  CHRONIC MIGRAINE - BOTOX Injection  CPT: 93882    Viviana Monzon is a(n) 59 year old female with chronic migraine.  Patient is here for Botox injection.      ( x ) Headaches are frequent and based on screening procedures, satisfies criteria of chronic migraine:  >15 days a month, each occurrence can be > 4 hours duration.   Note is made that she has tried 2 or more prophylactic treatment in the past and has not responded that is why we are using Botox.      ( x ) Headaches have responded well to previous Botox injection.   Follow up injection necessary because headaches are recurring.  Patient is likewise familiar with the risks. These were reviewed and patient requests to proceed.    ROS:  No additional issues added after completing 10 point ROS      Current Outpatient Medications:     ubrogepant (UBRELVY) 100 MG Oral Tab, Take one tablet at onset of migraine.  May take additional tablet in 2 hours if needed.  Do not exceed two tablets per 24 hour period., Disp: 10 tablet, Rfl: 5    traMADol 50 MG Oral Tab, Take 1 tablet (50 mg total) by mouth every 8 (eight) hours as needed for Pain., Disp: 60 tablet, Rfl: 0    ondansetron (ZOFRAN ODT) 4 MG Oral Tablet Dispersible, Take 1 tablet (4 mg total) by mouth every 8 (eight) hours as needed for Nausea., Disp: 30 tablet, Rfl: 5    estradiol (ESTRACE) 0.1 MG/GM Vaginal Cream, 2gm vaginally daily x 2 weeks, then 1gm twice weekly, Disp: 42.5 g, Rfl: 5    SUMAtriptan Succinate 100 MG Oral Tab, TAKE 1 TABLET BY MOUTH AS NEEDED FOR HEADACHE , MAY REPEAT ONCE IN 2 HOURS FOR PERSISTENT HEADACHE ., Disp: 27 tablet, Rfl: 3      /72 (BP Location: Left arm, Patient Position: Sitting, Cuff Size: adult)   Pulse 61   Resp 16   Ht 65\"   Wt 152 lb (68.9 kg)   LMP 08/10/2018   BMI 25.29 kg/m²   HENT:  Pink conjunctiva, anicteric sclerae, moist mucosa  Neck: No adenopathy or thyromegaly  Heart S1S2, no murmur  Lungs are  clear, no wheezing  Extremities: no cyanosis or edema      PROCEDURE:  Assisted by: CMA or RN in room  BOTOX injection for chronic migraine:  Using alcohol prep, fixed site protocol performed.  Botox was reconstituted to the following concentration:  5 units per 0.1 ml.      Muscles, number of sites, units used and Side injected were as follows:                                                 Units used     Side  Procerus   5 units        center  Corrugators 2 sites  10 units      R/L  Frontalis   4 sites  20 units      R/L  Temporalis   4 sites each side  40 units      R/L    SKIPPED POSTERIOR INJECTIONS      ADDITIONAL ADJUNCTIVE SITES  (   ) Masseters  10 units each side  units  (   ) Levator scapula   units  (   ) Rhomboids      units        IMPRESSION:  1. Chronic migraine w/o aura w/o status migrainosus, not intractable        Post injections instructions:  Expect response to start on the second or going into the 3rd week  Use ice packs and Tylenol ES if with pain at injections sites  Report any signs of infection or inflammation at site of injection  Use migraine medications the same was as before    After procedure check out process by PSRs and rooming RN/MA who were present during the procedure to assist.        Joshua Dyer MD  Vascular & General Neurology  [facility]

## 2024-11-20 ENCOUNTER — OFFICE VISIT (OUTPATIENT)
Dept: FAMILY MEDICINE CLINIC | Facility: CLINIC | Age: 59
End: 2024-11-20
Payer: COMMERCIAL

## 2024-11-20 VITALS
TEMPERATURE: 98 F | DIASTOLIC BLOOD PRESSURE: 67 MMHG | SYSTOLIC BLOOD PRESSURE: 111 MMHG | BODY MASS INDEX: 25.02 KG/M2 | RESPIRATION RATE: 12 BRPM | OXYGEN SATURATION: 99 % | HEART RATE: 69 BPM | HEIGHT: 65 IN | WEIGHT: 150.19 LBS

## 2024-11-20 DIAGNOSIS — H66.91 ACUTE RIGHT OTITIS MEDIA: Primary | ICD-10-CM

## 2024-11-20 DIAGNOSIS — H61.21 EXCESSIVE CERUMEN IN RIGHT EAR CANAL: ICD-10-CM

## 2024-11-20 PROCEDURE — 99213 OFFICE O/P EST LOW 20 MIN: CPT | Performed by: NURSE PRACTITIONER

## 2024-11-20 RX ORDER — FLUTICASONE PROPIONATE 50 MCG
2 SPRAY, SUSPENSION (ML) NASAL DAILY
Qty: 1 G | Refills: 0 | Status: SHIPPED | OUTPATIENT
Start: 2024-11-20 | End: 2024-12-04

## 2024-11-20 RX ORDER — AMOXICILLIN 875 MG/1
875 TABLET, COATED ORAL 2 TIMES DAILY
Qty: 20 TABLET | Refills: 0 | Status: SHIPPED | OUTPATIENT
Start: 2024-11-20 | End: 2024-11-30

## 2024-11-20 NOTE — PROGRESS NOTES
CHIEF COMPLAINT:     Chief Complaint   Patient presents with    Ear Problem     Symptoms started 13 days ago and now symptoms have progressed       HPI:   Viviana Monzon is a 59 year old female who presents for upper respiratory symptoms for   13  days. Patient reports congestion.  Associated symptoms include sinus pressure, right ear pain, right ear feels clogged with decreased hearing.  Denies fever, chills, GI symptoms.   Symptoms have been increased since onset.  Treating symptoms with milton.     No hx of COVID   She just returned from Hospitals in [place].     Current Outpatient Medications   Medication Sig Dispense Refill    ubrogepant (UBRELVY) 100 MG Oral Tab Take one tablet at onset of migraine.  May take additional tablet in 2 hours if needed.  Do not exceed two tablets per 24 hour period. 10 tablet 5    traMADol 50 MG Oral Tab Take 1 tablet (50 mg total) by mouth every 8 (eight) hours as needed for Pain. 60 tablet 0    ondansetron (ZOFRAN ODT) 4 MG Oral Tablet Dispersible Take 1 tablet (4 mg total) by mouth every 8 (eight) hours as needed for Nausea. 30 tablet 5    estradiol (ESTRACE) 0.1 MG/GM Vaginal Cream 2gm vaginally daily x 2 weeks, then 1gm twice weekly 42.5 g 5    SUMAtriptan Succinate 100 MG Oral Tab TAKE 1 TABLET BY MOUTH AS NEEDED FOR HEADACHE , MAY REPEAT ONCE IN 2 HOURS FOR PERSISTENT HEADACHE . 27 tablet 3      Past Medical History:    Anemia    Always seem to be low iron    Anxiety    Recent Anxiety attack- May 2022    Arthritis    Bloating    occasionally- when I eat dairy    Constipation    Occasionally when I eat dairy or take Iron supplements    Easy bruising    Food intolerance    I avoid gluten and dairy- more regular bowel movements .    Headache disorder    History of Migraines    Heavy menses    Had Fibroids. Hysterectomy 2018    High cholesterol    Migraines    Night sweats    Wears glasses    Weight gain    Gained 5-10 lbs      Past Surgical History:   Procedure Laterality Date     Colonoscopy  2015    Repeat in 10 years    Hysterectomy      9*2018          x2    Total abdom hysterectomy  2018         Social History     Socioeconomic History    Marital status:    Tobacco Use    Smoking status: Never     Passive exposure: Never    Smokeless tobacco: Never   Vaping Use    Vaping status: Never Used   Substance and Sexual Activity    Alcohol use: Yes     Alcohol/week: 1.0 standard drink of alcohol     Types: 1 Shots of liquor per week    Drug use: No   Other Topics Concern    Caffeine Concern Yes     Comment: 1 cupa and 1 ice tea    Exercise Yes     Comment: 5 x a week    Self-Exams Yes         REVIEW OF SYSTEMS:   GENERAL: feels well otherwise,   OK appetite  SKIN: no rashes or abnormal skin lesions  HEENT: See HPI  LUNGS: denies shortness of breath or wheezing, See HPI  CARDIOVASCULAR: denies chest pain or palpitations   GI: denies N/V/C or abdominal pain  NEURO: sinus headaches    EXAM:   /67 (BP Location: Right arm, Patient Position: Sitting, Cuff Size: adult)   Pulse 69   Temp 98.1 °F (36.7 °C) (Temporal)   Resp 12   Ht 5' 5\" (1.651 m)   Wt 150 lb 3.2 oz (68.1 kg)   LMP 08/10/2018   SpO2 99%   BMI 24.99 kg/m²   GENERAL: well developed, well nourished, in no apparent distress  SKIN: no rashes,no suspicious lesions  HEAD: atraumatic, normocephalic.  No tenderness on palpation of sinuses  EYES: conjunctiva clear  EARS: Tragus non tender on palpation bilaterally. External auditory canals healthy.  No swelling, erythema, or tenderness to bilat mastoid area.    Right TM: erythematous, + bulging, no retraction, no effusion, bony landmarks not visualized. Some cerumen   Left TM: pinkish clear, no bulging, no retraction, + fluid, bony landmarks visualized.  NOSE: Nostrils patent, clear nasal discharge, nasal mucosa pink and mildly inflamed  THROAT: Oral mucosa pink, moist. Posterior pharynx is not erythematous. No exudates. Tonsils 1/4.    NECK: Supple,  non-tender  LUNGS: clear to auscultation bilaterally; good air movement.  Breathing is non labored.  CARDIO: RRR without murmur  EXTREMITIES: no cyanosis, clubbing or edema  LYMPH:  + right anterior cervical lymphadenopathy.        ASSESSMENT AND PLAN:   Viviana Monzon is a 59 year old female who presents with     ASSESSMENT:   Encounter Diagnoses   Name Primary?    Acute right otitis media Yes    Excessive cerumen in right ear canal        PLAN:     Meds as listed below.  Tylenol/Motrin prn pain.    Risks, benefits, and side effects of medication explained and discussed.  Comfort measures as described in Patient Instructions.    Discussed S&S that require follow-up.         Meds & Refills for this Visit:  Requested Prescriptions     Signed Prescriptions Disp Refills    amoxicillin 875 MG Oral Tab 20 tablet 0     Sig: Take 1 tablet (875 mg total) by mouth 2 (two) times daily for 10 days.    fluticasone propionate 50 MCG/ACT Nasal Suspension 1 g 0     Si sprays by Each Nare route daily for 14 days.           Patient Instructions   It is very important to complete full course of antibiotic.   Debrox for ear wax  Flonase  Acetaminophen or ibuprofen according to package instructions as needed for pain  Call or return if symptoms worsen, do not improve in 3-5 days, or if fever of 100.4 or greater persists for 72 hours.      Middle Ear Infection (Otitis Media)   What is a middle ear infection?   A middle ear infection is an infection of the air-filled space in the ear behind the eardrum. Anyone can get an ear infection, but ear infections are more common in children less than 8 years old.   How does it occur?   Ear infections usually begin with a viral infection of the nose and throat. For example, a cold might lead to an infection of the ear. Ear infections may also occur when you have allergies. The viral infection or allergic reaction can cause swelling of the tube between your ear and throat (the eustachian tube).  The swelling may trap bacteria in your middle ear, resulting in a bacterial infection.   Pressure from the buildup of pus or fluid in the ear sometimes causes the eardrum to tear (rupture). The eardrum is a thin membrane that separates the delicate parts of the middle ear from the air and moisture in the ear canal.   What are the symptoms?   You may have one or more of these symptoms:   earache   hearing loss   feeling of blockage in the ear   fever   dizziness.   How is it diagnosed?   Your healthcare provider will ask about your symptoms and look at your eardrum.   Your healthcare provider may check for fluid in the ear using a light called an otoscope to look into the ear canal. A puff of air is blown into the ear and your provider looks for movement of the eardrum. If there is fluid behind the eardrum, the membrane will not move well.   How is it treated?   Antibiotic medicine is a common treatment for ear infections. However, recent studies have shown that the symptoms of ear infections often go away in a couple of days without antibiotics. Bacteria can become resistant to antibiotics, and the medicine may cause side effects. For these reasons, your healthcare provider may wait 1 to 3 days to see if the symptoms go away on their own before prescribing an antibiotic.   Your provider may recommend a decongestant (tablets or a nasal spray) to help clear the eustachian tube. This may help relieve pressure in the middle ear. For pain take a nonprescription pain reliever such as acetaminophen (Tylenol) or ibuprofen. Carefully follow the directions for using medicines, even if they are nonprescription.   How long will the effects last?   In most cases you should feel better in 2 to 3 days.   If you are taking an antibiotic and your eardrum has not returned to normal when your provider examines you again, you may need to take a different antibiotic or other medicine. In this case, it may take another 1 to 2 weeks  before your ear feels normal again.   What can I do to take care of myself?   Follow your healthcare provider's instructions.   If you are taking an antibiotic, take all of it according to the directions, even when the symptoms have gone away before you have finished it.   To help relieve pain, put a warm moist washcloth or a hot water bottle over the ear.   If you have discharge from your ear, you can wipe it away with a washcloth and loosely plug the ear with cotton to catch further drainage. If you have a lot of fluid and pus draining from your ear, the eardrum has probably ruptured. Ask your healthcare provider how to care for the ear if you have discharge. If the discharge is caused by a ruptured eardrum, then you will need to protect the ear from water. You will need one or more follow-up appointments to check the healing of your eardrum.   If you have a fever:   Rest until your temperature has fallen below 100°F (37.8°C). Then become as active as is comfortable.   Ask your provider if you can take aspirin, acetaminophen, or ibuprofen to control your fever. Anyone under the age of 21 with a viral illness should not take aspirin because of the increased risk of Reye's syndrome.   Keep a daily record of your temperature.   Call your healthcare provider if you have:   a temperature of 101.5 degrees F (38.6 degrees C) or higher that persists even after you take acetaminophen, aspirin, or ibuprofen   a severe headache or worsening pain around the ear   swelling around the ear   increasing dizziness   worsening of your hearing   weakness of one side of your face.   Keep all your appointments. Your healthcare provider may want you to have one or more follow-up exams until signs of inflammation and infection have disappeared.   How can I prevent an ear infection from occurring?   If you tend to get ear infections often, ask your healthcare provider if you need to be checked for allergies. Getting treatment for  allergies may help prevent ear infections.   Ask if using decongestants when you have a cold may help prevent you from getting ear infections.   Developed by [employer]   Published by [employer].   Last modified: 2008-01-15        The patient indicates understanding of these issues and agrees to the plan.

## 2024-11-20 NOTE — PATIENT INSTRUCTIONS
It is very important to complete full course of antibiotic.   Debrox for ear wax  Flonase  Acetaminophen or ibuprofen according to package instructions as needed for pain  Call or return if symptoms worsen, do not improve in 3-5 days, or if fever of 100.4 or greater persists for 72 hours.      Middle Ear Infection (Otitis Media)   What is a middle ear infection?   A middle ear infection is an infection of the air-filled space in the ear behind the eardrum. Anyone can get an ear infection, but ear infections are more common in children less than 8 years old.   How does it occur?   Ear infections usually begin with a viral infection of the nose and throat. For example, a cold might lead to an infection of the ear. Ear infections may also occur when you have allergies. The viral infection or allergic reaction can cause swelling of the tube between your ear and throat (the eustachian tube). The swelling may trap bacteria in your middle ear, resulting in a bacterial infection.   Pressure from the buildup of pus or fluid in the ear sometimes causes the eardrum to tear (rupture). The eardrum is a thin membrane that separates the delicate parts of the middle ear from the air and moisture in the ear canal.   What are the symptoms?   You may have one or more of these symptoms:   earache   hearing loss   feeling of blockage in the ear   fever   dizziness.   How is it diagnosed?   Your healthcare provider will ask about your symptoms and look at your eardrum.   Your healthcare provider may check for fluid in the ear using a light called an otoscope to look into the ear canal. A puff of air is blown into the ear and your provider looks for movement of the eardrum. If there is fluid behind the eardrum, the membrane will not move well.   How is it treated?   Antibiotic medicine is a common treatment for ear infections. However, recent studies have shown that the symptoms of ear infections often go away in a couple of days without  antibiotics. Bacteria can become resistant to antibiotics, and the medicine may cause side effects. For these reasons, your healthcare provider may wait 1 to 3 days to see if the symptoms go away on their own before prescribing an antibiotic.   Your provider may recommend a decongestant (tablets or a nasal spray) to help clear the eustachian tube. This may help relieve pressure in the middle ear. For pain take a nonprescription pain reliever such as acetaminophen (Tylenol) or ibuprofen. Carefully follow the directions for using medicines, even if they are nonprescription.   How long will the effects last?   In most cases you should feel better in 2 to 3 days.   If you are taking an antibiotic and your eardrum has not returned to normal when your provider examines you again, you may need to take a different antibiotic or other medicine. In this case, it may take another 1 to 2 weeks before your ear feels normal again.   What can I do to take care of myself?   Follow your healthcare provider's instructions.   If you are taking an antibiotic, take all of it according to the directions, even when the symptoms have gone away before you have finished it.   To help relieve pain, put a warm moist washcloth or a hot water bottle over the ear.   If you have discharge from your ear, you can wipe it away with a washcloth and loosely plug the ear with cotton to catch further drainage. If you have a lot of fluid and pus draining from your ear, the eardrum has probably ruptured. Ask your healthcare provider how to care for the ear if you have discharge. If the discharge is caused by a ruptured eardrum, then you will need to protect the ear from water. You will need one or more follow-up appointments to check the healing of your eardrum.   If you have a fever:   Rest until your temperature has fallen below 100°F (37.8°C). Then become as active as is comfortable.   Ask your provider if you can take aspirin, acetaminophen, or  ibuprofen to control your fever. Anyone under the age of 21 with a viral illness should not take aspirin because of the increased risk of Reye's syndrome.   Keep a daily record of your temperature.   Call your healthcare provider if you have:   a temperature of 101.5 degrees F (38.6 degrees C) or higher that persists even after you take acetaminophen, aspirin, or ibuprofen   a severe headache or worsening pain around the ear   swelling around the ear   increasing dizziness   worsening of your hearing   weakness of one side of your face.   Keep all your appointments. Your healthcare provider may want you to have one or more follow-up exams until signs of inflammation and infection have disappeared.   How can I prevent an ear infection from occurring?   If you tend to get ear infections often, ask your healthcare provider if you need to be checked for allergies. Getting treatment for allergies may help prevent ear infections.   Ask if using decongestants when you have a cold may help prevent you from getting ear infections.   Developed by [employer]   Published by [employer].   Last modified: 2008-01-15

## 2024-12-02 ENCOUNTER — OFFICE VISIT (OUTPATIENT)
Dept: FAMILY MEDICINE CLINIC | Facility: CLINIC | Age: 59
End: 2024-12-02
Payer: COMMERCIAL

## 2024-12-02 VITALS
DIASTOLIC BLOOD PRESSURE: 70 MMHG | TEMPERATURE: 98 F | RESPIRATION RATE: 16 BRPM | HEIGHT: 66.25 IN | WEIGHT: 151 LBS | SYSTOLIC BLOOD PRESSURE: 102 MMHG | BODY MASS INDEX: 24.27 KG/M2

## 2024-12-02 DIAGNOSIS — H66.004 RECURRENT ACUTE SUPPURATIVE OTITIS MEDIA OF RIGHT EAR WITHOUT SPONTANEOUS RUPTURE OF TYMPANIC MEMBRANE: ICD-10-CM

## 2024-12-02 DIAGNOSIS — J01.10 ACUTE NON-RECURRENT FRONTAL SINUSITIS: Primary | ICD-10-CM

## 2024-12-02 NOTE — PROGRESS NOTES
[facility] - [place]    Chief Complaint   Patient presents with    Ear Problem     Went to [facility] 24 and given 10 days antibiotic for R ear congestion. Hearing is still affected. Now has sinus HA. PND        HPI:   Viviana Monzon is 59 year old patient presenting for the followin. Facial pain, headache, decreased hearing in the right ear. Started as a cold 3 weeks ago and when flying home she developed pain in the right ear. She completed amox x10 days. Her hearing has improved but is still significantly decreased. Feels symptoms worsened yesterday. She feels a migraine is coming on.  Not endorsing fevers. Not endorsing nausea/vomiting. Staying hydrated.      PMH:  Patient Active Problem List   Diagnosis    Dense breasts    Migraine    PFO (patent foramen ovale) (HCC)    S/P laparoscopic hysterectomy    Pure hypercholesterolemia    Benign neoplasm of cecum    Benign neoplasm of transverse colon     Past Medical History:    Anemia    Always seem to be low iron    Anxiety    Recent Anxiety attack- May 2022    Arthritis    Bloating    occasionally- when I eat dairy    Constipation    Occasionally when I eat dairy or take Iron supplements    Easy bruising    Food intolerance    I avoid gluten and dairy- more regular bowel movements .    Headache disorder    History of Migraines    Heavy menses    Had Fibroids. Hysterectomy 2018    High cholesterol    Migraines    Night sweats    Wears glasses    Weight gain    Gained 5-10 lbs          SH: reviewed     FH: reviewed        ROS: full 10 point review of systems done and otherwise negative.      Healthcare Maintenance:       PE:  Vital Signs    24 0922   BP: 102/70   Resp: 16   Temp: 98.3 °F (36.8 °C)     Wt Readings from Last 3 Encounters:   24 151 lb (68.5 kg)   24 150 lb 3.2 oz (68.1 kg)   10/15/24 152 lb (68.9 kg)     Body mass index is 24.19 kg/m².     Physical Exam:  GEN: Well-appearing, NAD, nontoxic  HEENT: NC/AT, PERRL, EOMI,  R TM suppurative otitis with mild erythema in the canal after cerumen removal.. L TM serous otitis. No erythema.  CARD: RRR, no m/r/g  PULM: CTA imer  ABD: soft, nt, nd  EXT: No gross deformity  NEURO: no gross deficit  PSYCH: normal affect, thought process linear     No visits with results within 4 Week(s) from this visit.   Latest known visit with results is:   [facility] Encounter on 2024   Component Date Value Ref Range Status    Rheumatoid Factor 2024 <10.0  <15.0 IU/mL Final    Sed Rate 2024 6  0 - 30 mm/Hr Final        A/P: Viviana Monzon is 59 year old presenting for the followin. Acute non-recurrent frontal sinusitis    2. Recurrent acute suppurative otitis media of right ear without spontaneous rupture of tympanic membrane - Supporative otitis media on the right and serous ototis on the left. Now with facial pain and ear discomfort has not resolved. Decreased hearing improved after cerumen removal. If dec hearing persists, will need ENT referral.  - amoxicillin clavulanate 875-125 MG Oral Tab; Take 1 tablet by mouth 2 (two) times daily for 10 days.  Dispense: 20 tablet; Refill: 0    Ear cerumen impacted, manual removal by provider in the office today.      Encounter Medications[1]        Side effects, risks and benefits of medications were explained.  The patient or responsible adult showed the ability to learn, asked appropriate questions.  There were no barriers to learning and they verbalized understanding of the treatment plan.     Medication list provided to patient and /or family member.        Radha Hernández MD         [1]   Outpatient Encounter Medications as of 2024   Medication Sig Dispense Refill    fluticasone propionate 50 MCG/ACT Nasal Suspension 2 sprays by Each Nare route daily for 14 days. 1 g 0    ubrogepant (UBRELVY) 100 MG Oral Tab Take one tablet at onset of migraine.  May take additional tablet in 2 hours if needed.  Do not exceed two tablets per 24 hour  period. 10 tablet 5    traMADol 50 MG Oral Tab Take 1 tablet (50 mg total) by mouth every 8 (eight) hours as needed for Pain. 60 tablet 0    ondansetron (ZOFRAN ODT) 4 MG Oral Tablet Dispersible Take 1 tablet (4 mg total) by mouth every 8 (eight) hours as needed for Nausea. 30 tablet 5    estradiol (ESTRACE) 0.1 MG/GM Vaginal Cream 2gm vaginally daily x 2 weeks, then 1gm twice weekly (Patient not taking: Reported on 12/2/2024) 42.5 g 5    SUMAtriptan Succinate 100 MG Oral Tab TAKE 1 TABLET BY MOUTH AS NEEDED FOR HEADACHE , MAY REPEAT ONCE IN 2 HOURS FOR PERSISTENT HEADACHE . (Patient not taking: Reported on 12/2/2024) 27 tablet 3     No facility-administered encounter medications on file as of 12/2/2024.

## 2025-01-14 ENCOUNTER — OFFICE VISIT (OUTPATIENT)
Dept: NEUROLOGY | Facility: CLINIC | Age: 60
End: 2025-01-14
Payer: COMMERCIAL

## 2025-01-14 VITALS
HEART RATE: 73 BPM | SYSTOLIC BLOOD PRESSURE: 101 MMHG | HEIGHT: 66.25 IN | RESPIRATION RATE: 16 BRPM | BODY MASS INDEX: 24.27 KG/M2 | WEIGHT: 151 LBS | DIASTOLIC BLOOD PRESSURE: 41 MMHG

## 2025-01-14 DIAGNOSIS — G43.709 CHRONIC MIGRAINE W/O AURA W/O STATUS MIGRAINOSUS, NOT INTRACTABLE: Primary | ICD-10-CM

## 2025-01-14 PROCEDURE — 64615 CHEMODENERV MUSC MIGRAINE: CPT | Performed by: OTHER

## 2025-01-14 NOTE — PATIENT INSTRUCTIONS
Refill policies:    Allow 2-3 business days for refills; controlled substances may take longer.  Contact your pharmacy at least 5 days prior to running out of medication and have them send an electronic request or submit request through the “request refill” option in your [employer] account.  Refills are not addressed on weekends; covering physicians do not authorize routine medications on weekends.  No narcotics or controlled substances are refilled after noon on Fridays or by on call physicians.  By law, narcotics must be electronically prescribed.  A 30 day supply with no refills is the maximum allowed.  If your prescription is due for a refill, you may be due for a follow up appointment.  To best provide you care, patients receiving routine medications need to be seen at least once a year.  Patients receiving narcotic/controlled substance medications need to be seen at least once every 3 months.  In the event that your preferred pharmacy does not have the requested medication in stock (e.g. Backordered), it is your responsibility to find another pharmacy that has the requested medication available.  We will gladly send a new prescription to that pharmacy at your request.    Scheduling Tests:    If your physician has ordered radiology tests such as MRI or CT scans, please contact Central Scheduling at [phone] right away to schedule the test.  Once scheduled, the [facility] Centralized Referral Team will work with your insurance carrier to obtain pre-certification or prior authorization.  Depending on your insurance carrier, approval may take 3-10 days.  It is highly recommended patients assure they have received an authorization before having a test performed.  If test is done without insurance authorization, patient may be responsible for the entire amount billed.      Precertification and Prior Authorizations:  If your physician has recommended that you have a procedure or additional testing performed the [facility]  Centralized Referral Team will contact your insurance carrier to obtain pre-certification or prior authorization.    You are strongly encouraged to contact your insurance carrier to verify that your procedure/test has been approved and is a COVERED benefit.  Although the [facility] Centralized Referral Team does its due diligence, the insurance carrier gives the disclaimer that \"Although the procedure is authorized, this does not guarantee payment.\"    Ultimately the patient is responsible for payment.   Thank you for your understanding in this matter.  Paperwork Completion:  If you require FMLA or disability paperwork for your recovery, please make sure to either drop it off or have it faxed to our office at [phone]. Be sure the form has your name and date of birth on it.  The form will be faxed to our Forms Department and they will complete it for you.  There is a 25$ fee for all forms that need to be filled out.  Please be aware there is a 10-14 day turnaround time.  You will need to sign a release of information (NANCY) form if your paperwork does not come with one.  You may call the Forms Department with any questions at [phone].  Their fax number is [phone].

## 2025-01-14 NOTE — PROGRESS NOTES
Paperwork noting that patient may bear financial responsibility for procedure(s) performed in clinic today signed prior to proceeding with procedure(s).    Furthermore, patient notified that they should contact their insurer to verify that your procedure/test has been approved and is a COVERED benefit.  Although the [facility] staff does its due diligence, the insurance carrier gives the disclaimer that \"Although the procedure is authorized, this does not guarantee payment.\"    Ultimately the patient is responsible for payment.    Botox is:  [x] Buy and Bill  [] Patient Supplied      Botox Reauthorization Questions:  Has the patient experienced a reduction in frequency of migraines since starting Botox? yes  If yes, by what percentage? 50%  Has the intensity of migraines decreased since starting Botox? yes  If yes, by what percentage? 50%    [x] I have discussed with patient that if their insurance changes they must contact the office right away with that information so that a new prior authorization can be completed.  Patient verbalized understanding that Botox cannot be performed without a current prior authorization in place with correct insurance.

## 2025-01-14 NOTE — PROGRESS NOTES
NEUROLOGY  [facility]     1/14/2025  CHRONIC MIGRAINE - BOTOX Injection  CPT: 48867    Viviana Monzon is a(n) 59 year old female with chronic migraine.  Patient is here for Botox injection.      ( x ) Headaches are frequent and based on screening procedures, satisfies criteria of chronic migraine:  >15 days a month, each occurrence can be > 4 hours duration.   Note is made that she has tried 2 or more prophylactic treatment in the past and has not responded that is why we are using Botox.      ( x ) Headaches have responded well to previous Botox injection.   Follow up injection necessary because headaches are recurring.  Patient is likewise familiar with the risks. These were reviewed and patient requests to proceed.    ROS:  No additional issues added after completing 10 point ROS      Current Outpatient Medications:     ubrogepant (UBRELVY) 100 MG Oral Tab, Take one tablet at onset of migraine.  May take additional tablet in 2 hours if needed.  Do not exceed two tablets per 24 hour period., Disp: 10 tablet, Rfl: 5    traMADol 50 MG Oral Tab, Take 1 tablet (50 mg total) by mouth every 8 (eight) hours as needed for Pain., Disp: 60 tablet, Rfl: 0    ondansetron (ZOFRAN ODT) 4 MG Oral Tablet Dispersible, Take 1 tablet (4 mg total) by mouth every 8 (eight) hours as needed for Nausea., Disp: 30 tablet, Rfl: 5      /41 (BP Location: Left arm, Patient Position: Sitting, Cuff Size: adult)   Pulse 73   Resp 16   Ht 66.25\"   Wt 151 lb (68.5 kg)   LMP 08/10/2018   BMI 24.19 kg/m²   HENT:  Pink conjunctiva, anicteric sclerae, moist mucosa  Neck: No adenopathy or thyromegaly  Heart S1S2, no murmur  Lungs are clear, no wheezing  Extremities: no cyanosis or edema      PROCEDURE:  Assisted by: CMA or RN in room  BOTOX injection for chronic migraine:  Using alcohol prep, fixed site protocol performed.  Botox was reconstituted to the following concentration:  5 units per 0.1 ml.      Muscles, number  of sites, units used and Side injected were as follows:                                                 Units used     Side  Procerus   5 units        center  Corrugators 2 sites  10 units      R/L  Frontalis   4 sites  20 units      R/L  Temporalis   4 sites each side  50 units      R/L  TOTAL       105  Discarded:    95 units        ADDITIONAL ADJUNCTIVE SITES  (   ) Masseters  10  units each side  (   ) Levator scapula   units  (   ) Rhomboids      units        IMPRESSION:  1. Chronic migraine w/o aura w/o status migrainosus, not intractable        Post injections instructions:  Expect response to start on the second or going into the 3rd week  Use ice packs and Tylenol ES if with pain at injections sites  Report any signs of infection or inflammation at site of injection  Use migraine medications the same was as before    After procedure check out process by PSRs and rooming RN/MA who were present during the procedure to assist.        Joshua Dyer MD  Vascular & General Neurology  [facility]

## 2025-03-10 ENCOUNTER — TELEPHONE (OUTPATIENT)
Dept: NEUROLOGY | Facility: CLINIC | Age: 60
End: 2025-03-10

## 2025-03-11 NOTE — TELEPHONE ENCOUNTER
Upon EPIC review, patient has not been evaluated for migraines since 8/23/2022. All visits have been for BOTOX treatment.    Although she has been treated with BOTOX since prior to 2019 with effective migraine control, we do not have recent supportive documentation to reverse the denial of BOTOX at this time.    Per Dr Dyer, patient should schedule a sooner office visit than current 4/28/2025 scheduled visit.  At that time, we will evaluate, document, and either try another prophylactic migraine medication or try again for BOTOX approval.    In the mean time,   Left message for patient to return call, will discuss this plan.

## 2025-03-11 NOTE — TELEPHONE ENCOUNTER
Received denial for Botox.    Denial Reason:    The information submitted did not meet the criteria necessary to approve this medication.      Faxing denial letter to [facility].

## 2025-03-11 NOTE — TELEPHONE ENCOUNTER
Spoke to patient who will call back to schedule an office visit with Lianna Rascon PA-C for migraine evaluation.    Faxed clinicals with detailed information for failed migraine preventative medications    Will await determination.  Patient is aware of situation, will inform of determination.

## 2025-04-28 ENCOUNTER — OFFICE VISIT (OUTPATIENT)
Dept: NEUROLOGY | Facility: CLINIC | Age: 60
End: 2025-04-28
Payer: COMMERCIAL

## 2025-04-28 VITALS
RESPIRATION RATE: 16 BRPM | HEIGHT: 66.25 IN | WEIGHT: 151 LBS | DIASTOLIC BLOOD PRESSURE: 58 MMHG | BODY MASS INDEX: 24.27 KG/M2 | HEART RATE: 65 BPM | SYSTOLIC BLOOD PRESSURE: 104 MMHG

## 2025-04-28 DIAGNOSIS — G24.3 CERVICAL DYSTONIA: ICD-10-CM

## 2025-04-28 DIAGNOSIS — G43.709 CHRONIC MIGRAINE W/O AURA W/O STATUS MIGRAINOSUS, NOT INTRACTABLE: Primary | ICD-10-CM

## 2025-04-28 PROCEDURE — 99214 OFFICE O/P EST MOD 30 MIN: CPT | Performed by: OTHER

## 2025-04-28 RX ORDER — ATOGEPANT 60 MG/1
60 TABLET ORAL DAILY
Qty: 30 TABLET | Refills: 0 | Status: SHIPPED | OUTPATIENT
Start: 2025-04-28

## 2025-04-28 NOTE — PATIENT INSTRUCTIONS
Refill policies:    Allow 2-3 business days for refills; controlled substances may take longer.  Contact your pharmacy at least 5 days prior to running out of medication and have them send an electronic request or submit request through the “request refill” option in your [employer] account.  Refills are not addressed on weekends; covering physicians do not authorize routine medications on weekends.  No narcotics or controlled substances are refilled after noon on Fridays or by on call physicians.  By law, narcotics must be electronically prescribed.  A 30 day supply with no refills is the maximum allowed.  If your prescription is due for a refill, you may be due for a follow up appointment.  To best provide you care, patients receiving routine medications need to be seen at least once a year.  Patients receiving narcotic/controlled substance medications need to be seen at least once every 3 months.  In the event that your preferred pharmacy does not have the requested medication in stock (e.g. Backordered), it is your responsibility to find another pharmacy that has the requested medication available.  We will gladly send a new prescription to that pharmacy at your request.    Scheduling Tests:    If your physician has ordered radiology tests such as MRI or CT scans, please contact Central Scheduling at [phone] right away to schedule the test.  Once scheduled, the [facility] Centralized Referral Team will work with your insurance carrier to obtain pre-certification or prior authorization.  Depending on your insurance carrier, approval may take 3-10 days.  It is highly recommended patients assure they have received an authorization before having a test performed.  If test is done without insurance authorization, patient may be responsible for the entire amount billed.      Precertification and Prior Authorizations:  If your physician has recommended that you have a procedure or additional testing performed the [facility]  Centralized Referral Team will contact your insurance carrier to obtain pre-certification or prior authorization.    You are strongly encouraged to contact your insurance carrier to verify that your procedure/test has been approved and is a COVERED benefit.  Although the [facility] Centralized Referral Team does its due diligence, the insurance carrier gives the disclaimer that \"Although the procedure is authorized, this does not guarantee payment.\"    Ultimately the patient is responsible for payment.   Thank you for your understanding in this matter.  Paperwork Completion:  If you require FMLA or disability paperwork for your recovery, please make sure to either drop it off or have it faxed to our office at [phone]. Be sure the form has your name and date of birth on it.  The form will be faxed to our Forms Department and they will complete it for you.  There is a 25$ fee for all forms that need to be filled out.  Please be aware there is a 10-14 day turnaround time.  You will need to sign a release of information (NANCY) form if your paperwork does not come with one.  You may call the Forms Department with any questions at [phone].  Their fax number is [phone].

## 2025-04-28 NOTE — PROGRESS NOTES
NEUROLOGY  [facility]       Viviana Monzon  6/25/1965  Primary Care Provider:  Sowmya Cosby MD    4/28/2025  59 year old yo,  was last seen on:: January    Seen for/plans last visit:  Chronic Migraines    Previous visit and existing record notes reviewed in preparation for the face to face visit.  Relevant labs and studies reviewed and will be noted in relevant areas of this record.  Accompanied visit:     (x) No.      Present condition:  She was supposed to have Botox today but was denied  The frequency increased especially with the weather changes  Averages 3-4 headache days.    Current Abortive is UBRELVY   Better than triptan because of side effects   Takes longer to kick in   Tried NURTEC - it worked    Prior to Botox had failed Lopressior (Beta blocker) and antidepressants  Adjunctive treatments: Acupuncture, Chiropractor and PTX all tried and failed  Supplements like Migrelief  Did tolerate anticonvulsants    She was injection averse so we did not try the injectable CGRP    The use of BOTOX worked most effectively      Past History update/new problem(s): as noted above    Review of Systems:  Review of Systems:  Denies systemic symptoms     No CP or SOB.  No GI or  symptoms. Relevant Neuro as noted above.      Medications:    Medications - Current[1]  PRN: PRN Medications[2]    Allergies:  Allergies[3]       EXAM:  /58 (BP Location: Left arm, Patient Position: Sitting, Cuff Size: adult)   Pulse 65   Resp 16   Ht 66.25\"   Wt 151 lb (68.5 kg)   LMP 08/10/2018   BMI 24.19 kg/m²   Looks stated age  General Exam:  HENT:  pink conjunctiva anicteric sclerae  Neck no adenopathy, thyroid normal  Heart and Lungs:  normal  Extremities: no cyanosis, skin changes    NEURO  NEURO  Able to relate events with fluent speech and intact comprehension  CN 2-12: pupils reactive, VF full face symmetric sensation and movement tongue midline  No motor focal findings  Sensory: no lateralizing  findings  Reflexes are symmetric  UMN signs: none  Gait: narrow based          INTERPRETATION of RELEVANT LABS and other DATA:          Problem/s Identified this visit:   1. Chronic migraine w/o aura w/o status migrainosus, not intractable    2. Cervical dystonia          Discussion plus Diagnostics & Treatment Orders:  Qulipta to be started  Not in formulary BUT all the alternatives suggested have failed  Orders Placed This Encounter    Atogepant (QULIPTA) 60 MG Oral Tab     Sig: Take 60 mg by mouth daily.     Dispense:  30 tablet     Refill:  0           (x) Discussed potential side effects of any treatment relevant to above.  Includes explanation of tests as necessary.    6 months    Patient understands that if needed, based on condition and or test results, follow up will be readjusted      Joshua Dyer MD  Vascular & General Neurology  Director, Multiple Sclerosis Program  [facility]  4/28/2025, Time completed 9:39 AM    Decision making:  ( x ) labs reviewed/ordered - 1  (  ) new diagnosis: - 1  ( x) Images & studies independently reviewed -non F2F  (  ) Case/studies discussed with other caregivers - -non F2F  (  ) Telephone time with patiern or authorized [facility] member--non F2F  ( x ) other records reviewed --non F2F including consultations  (  ) [facility] meetings - patient not present --non F2F  (  ) Independent Historian obtained    Non Face to Face CPT code 65941/28363 applies as documented above    PROCEDURE DONE     (   ) see notes        After visit, patient was escorted out and handed-off discharge process and instructions to the check out desk.  No additional issues relevant to visit were raised to staff at this time interval.        This document is to be interpreted as my current opinion regarding the case as of the stated date of service based on the information available to me at this time and may supersedes any prior opinion expressed either orally or in writing.  Services rendered  are only within the scope of direct medical care  Sometimes, reports may have been prepared partially using a speech recognition software technology.  If a word or phrase is confusing or out of context, please do not hesitate to call for clarification.              [1]   Current Outpatient Medications:     Atogepant (QULIPTA) 60 MG Oral Tab, Take 60 mg by mouth daily., Disp: 30 tablet, Rfl: 0    ubrogepant (UBRELVY) 100 MG Oral Tab, Take one tablet at onset of migraine.  May take additional tablet in 2 hours if needed.  Do not exceed two tablets per 24 hour period., Disp: 10 tablet, Rfl: 5    traMADol 50 MG Oral Tab, Take 1 tablet (50 mg total) by mouth every 8 (eight) hours as needed for Pain., Disp: 60 tablet, Rfl: 0    ondansetron (ZOFRAN ODT) 4 MG Oral Tablet Dispersible, Take 1 tablet (4 mg total) by mouth every 8 (eight) hours as needed for Nausea., Disp: 30 tablet, Rfl: 5  [2] [3]   Allergies  Allergen Reactions    Morphine NAUSEA AND VOMITING     Following IV admin in er for migraine    Sertraline OTHER (SEE COMMENTS)     Intolerance have side effects

## 2025-04-29 ENCOUNTER — TELEPHONE (OUTPATIENT)
Dept: NEUROLOGY | Facility: CLINIC | Age: 60
End: 2025-04-29

## 2025-04-29 NOTE — TELEPHONE ENCOUNTER
Initiated prior authorization for Qulipta 60mg through [employer].    Approval Details    Authorized from March 30, 2025 to April 29, 2026

## 2025-05-21 ENCOUNTER — PATIENT MESSAGE (OUTPATIENT)
Dept: FAMILY MEDICINE CLINIC | Facility: CLINIC | Age: 60
End: 2025-05-21

## 2025-05-21 DIAGNOSIS — Z00.00 ROUTINE GENERAL MEDICAL EXAMINATION AT A HEALTH CARE FACILITY: Primary | ICD-10-CM

## 2025-05-27 ENCOUNTER — LAB ENCOUNTER (OUTPATIENT)
Dept: LAB | Age: 60
End: 2025-05-27
Attending: FAMILY MEDICINE
Payer: COMMERCIAL

## 2025-05-27 DIAGNOSIS — Z00.00 ROUTINE GENERAL MEDICAL EXAMINATION AT A HEALTH CARE FACILITY: ICD-10-CM

## 2025-05-27 LAB
ALBUMIN SERPL-MCNC: 4.5 G/DL (ref 3.2–4.8)
ALBUMIN/GLOB SERPL: 1.9 {RATIO} (ref 1–2)
ALP LIVER SERPL-CCNC: 34 U/L (ref 46–118)
ALT SERPL-CCNC: 20 U/L (ref 10–49)
ANION GAP SERPL CALC-SCNC: 11 MMOL/L (ref 0–18)
AST SERPL-CCNC: 21 U/L (ref ?–34)
BILIRUB SERPL-MCNC: 0.6 MG/DL (ref 0.2–1.1)
BUN BLD-MCNC: 17 MG/DL (ref 9–23)
CALCIUM BLD-MCNC: 9.2 MG/DL (ref 8.7–10.6)
CHLORIDE SERPL-SCNC: 104 MMOL/L (ref 98–112)
CHOLEST SERPL-MCNC: 234 MG/DL (ref ?–200)
CO2 SERPL-SCNC: 27 MMOL/L (ref 21–32)
CREAT BLD-MCNC: 0.99 MG/DL (ref 0.55–1.02)
DEPRECATED HBV CORE AB SER IA-ACNC: 28 NG/ML (ref 50–306)
EGFRCR SERPLBLD CKD-EPI 2021: 65 ML/MIN/1.73M2 (ref 60–?)
ERYTHROCYTE [DISTWIDTH] IN BLOOD BY AUTOMATED COUNT: 13.3 %
FASTING PATIENT LIPID ANSWER: YES
FASTING STATUS PATIENT QL REPORTED: YES
GLOBULIN PLAS-MCNC: 2.4 G/DL (ref 2–3.5)
GLUCOSE BLD-MCNC: 101 MG/DL (ref 70–99)
HCT VFR BLD AUTO: 37.5 % (ref 35–48)
HDLC SERPL-MCNC: 78 MG/DL (ref 40–59)
HGB BLD-MCNC: 12.6 G/DL (ref 12–16)
LDLC SERPL CALC-MCNC: 141 MG/DL (ref ?–100)
MCH RBC QN AUTO: 30.4 PG (ref 26–34)
MCHC RBC AUTO-ENTMCNC: 33.6 G/DL (ref 31–37)
MCV RBC AUTO: 90.6 FL (ref 80–100)
NONHDLC SERPL-MCNC: 156 MG/DL (ref ?–130)
OSMOLALITY SERPL CALC.SUM OF ELEC: 296 MOSM/KG (ref 275–295)
PLATELET # BLD AUTO: 163 10(3)UL (ref 150–450)
POTASSIUM SERPL-SCNC: 4.1 MMOL/L (ref 3.5–5.1)
PROT SERPL-MCNC: 6.9 G/DL (ref 5.7–8.2)
RBC # BLD AUTO: 4.14 X10(6)UL (ref 3.8–5.3)
SODIUM SERPL-SCNC: 142 MMOL/L (ref 136–145)
TRIGL SERPL-MCNC: 86 MG/DL (ref 30–149)
TSI SER-ACNC: 2.92 UIU/ML (ref 0.55–4.78)
VIT D+METAB SERPL-MCNC: 32.2 NG/ML (ref 30–100)
VLDLC SERPL CALC-MCNC: 16 MG/DL (ref 0–30)
WBC # BLD AUTO: 4.2 X10(3) UL (ref 4–11)

## 2025-05-27 PROCEDURE — 80053 COMPREHEN METABOLIC PANEL: CPT

## 2025-05-27 PROCEDURE — 36415 COLL VENOUS BLD VENIPUNCTURE: CPT

## 2025-05-27 PROCEDURE — 82306 VITAMIN D 25 HYDROXY: CPT

## 2025-05-27 PROCEDURE — 85027 COMPLETE CBC AUTOMATED: CPT

## 2025-05-27 PROCEDURE — 82728 ASSAY OF FERRITIN: CPT

## 2025-05-27 PROCEDURE — 84443 ASSAY THYROID STIM HORMONE: CPT

## 2025-05-27 PROCEDURE — 80061 LIPID PANEL: CPT

## 2025-06-20 ENCOUNTER — OFFICE VISIT (OUTPATIENT)
Dept: FAMILY MEDICINE CLINIC | Facility: CLINIC | Age: 60
End: 2025-06-20
Payer: COMMERCIAL

## 2025-06-20 VITALS
BODY MASS INDEX: 23.98 KG/M2 | HEART RATE: 71 BPM | TEMPERATURE: 99 F | HEIGHT: 66 IN | DIASTOLIC BLOOD PRESSURE: 68 MMHG | SYSTOLIC BLOOD PRESSURE: 102 MMHG | RESPIRATION RATE: 16 BRPM | WEIGHT: 149.19 LBS

## 2025-06-20 DIAGNOSIS — Z12.31 VISIT FOR SCREENING MAMMOGRAM: ICD-10-CM

## 2025-06-20 DIAGNOSIS — Z00.00 ROUTINE GENERAL MEDICAL EXAMINATION AT A HEALTH CARE FACILITY: Primary | ICD-10-CM

## 2025-06-20 PROCEDURE — 99396 PREV VISIT EST AGE 40-64: CPT | Performed by: FAMILY MEDICINE

## 2025-06-20 NOTE — PATIENT INSTRUCTIONS
A heart scan, a CT scan of the heart, is the safest and most accurate screening tool for detecting the early build-up of calcium in the coronary arteries, the most common cause of heart disease. This simple, painless and potentially lifesaving test takes just 15 minutes.    To schedule call [phone]    Heart scan locations:  [place] [facility]  [facility] [place] [facility] [place] (enter through the Emergency Dept.)    Make an appointment for a heart scan if you are male over age 40 or a female over age 45, and have one or more of these risk factors:  High blood pressure  High cholesterol  Smoking  Obesity  Diabetes  Family history of heart disease

## 2025-06-20 NOTE — PROGRESS NOTES
HPI:   Viviana Monzon is a 59 year old female who presents for a complete physical exam.  Last pap:  S/p hyst age 53, preserved ovaries  Last mammogram:  5/2024  Previous colonoscopy:  2/2023 - repeat in 3 years.   Previous DEXA:  /.  Current or previous treatment:  /  Family hx of breast, ovarian, cervical or colon CA:  Mom breast  Immunizations:  Tdap:  Td 5/2022, Flu:  yearly, Prevnar:  /, Shingles:  2/2022, 9/2022   with 2 boys.    Lipids:  LDL minimally elevated.  Only elevated lipoprotein B, lipoprotein a level was normal. Focusing on diet/exercise  Heart scan 0 7/2022.  Consider repeating in 3-5 years.     Migraines:  seeing neuro.  Prn meds.    Vitamin D def:  Takes supplement    Vaginal dryness: Did not begin vaginal estradiol, may consider starting.    Wt Readings from Last 6 Encounters:   06/20/25 149 lb 3.2 oz (67.7 kg)   04/28/25 151 lb (68.5 kg)   01/14/25 151 lb (68.5 kg)   12/02/24 151 lb (68.5 kg)   11/20/24 150 lb 3.2 oz (68.1 kg)   10/15/24 152 lb (68.9 kg)     Body mass index is 24.08 kg/m².     Cholesterol, Total (mg/dL)   Date Value   05/27/2025 234 (H)   04/24/2024 249 (H)   04/17/2023 240 (H)     Cholesterol (mg/dL)   Date Value   11/11/2019 230.00 (H)   11/16/2018 226 (H)     HDL Cholesterol (mg/dL)   Date Value   05/27/2025 78 (H)   04/24/2024 89 (H)   04/17/2023 87 (H)     Direct HDL (mg/dL)   Date Value   11/11/2019 83   11/16/2018 90     LDL Cholesterol (mg/dL)   Date Value   05/27/2025 141 (H)   04/24/2024 152 (H)   04/17/2023 140 (H)     Calculated LDL (mg/dL)   Date Value   11/11/2019 134 (H)   11/16/2018 128        Current Outpatient Medications   Medication Sig Dispense Refill    Atogepant (QULIPTA) 60 MG Oral Tab Take 60 mg by mouth daily. 30 tablet 0    ubrogepant (UBRELVY) 100 MG Oral Tab Take one tablet at onset of migraine.  May take additional tablet in 2 hours if needed.  Do not exceed two tablets per 24 hour period. 10 tablet 5    traMADol 50 MG Oral Tab Take 1 tablet  (50 mg total) by mouth every 8 (eight) hours as needed for Pain. 60 tablet 0    ondansetron (ZOFRAN ODT) 4 MG Oral Tablet Dispersible Take 1 tablet (4 mg total) by mouth every 8 (eight) hours as needed for Nausea. 30 tablet 5      Patient Active Problem List   Diagnosis    Dense breasts    Migraine    PFO (patent foramen ovale) (HCC)    S/P laparoscopic hysterectomy    Pure hypercholesterolemia    Benign neoplasm of cecum    Benign neoplasm of transverse colon     Past Medical History:    Anemia    Always seem to be low iron    Anxiety    Recent Anxiety attack- May 2022    Arthritis    Bloating    occasionally- when I eat dairy    Constipation    Occasionally when I eat dairy or take Iron supplements    Easy bruising    Food intolerance    I avoid gluten and dairy- more regular bowel movements .    Headache disorder    History of Migraines    Heavy menses    Had Fibroids. Hysterectomy     High cholesterol    Migraines    Night sweats    Wears glasses    Weight gain    Gained 5-10 lbs      Past Surgical History:   Procedure Laterality Date    Colonoscopy  2015    Repeat in 10 years    Hysterectomy                x2    Total abdom hysterectomy  2018      Family History   Problem Relation Age of Onset    Breast Cancer Mother 86    High Blood Pressure Mother     Cancer Mother         pancreatic    Cancer Father         esophageal     High Cholesterol Father     Heart Disease Father         CABG    Prostate Cancer Father             Heart Attack Father     Stroke Maternal Grandfather     Stroke Paternal Grandmother     Stroke Paternal Grandfather       Social History:   Social History     Socioeconomic History    Marital status:    Tobacco Use    Smoking status: Never     Passive exposure: Never    Smokeless tobacco: Never   Vaping Use    Vaping status: Never Used   Substance and Sexual Activity    Alcohol use: Yes     Alcohol/week: 1.0 standard drink of alcohol     Types: 1  Shots of liquor per week     Comment: 1 drink a week    Drug use: No   Other Topics Concern    Caffeine Concern Yes     Comment: 2 servings a day    Exercise Yes     Comment: 5 x a week    Self-Exams Yes     Comment: self breast exam every two weeks        REVIEW OF SYSTEMS:   GENERAL: feels well otherwise  LUNGS: denies shortness of breath  CARDIOVASCULAR: denies chest pain  GI: denies abdominal pain,  No constipation or diarrhea  : denies dysuria, vaginal discharge or itching    EXAM:   /68   Pulse 71   Temp 98.9 °F (37.2 °C)   Resp 16   Ht 5' 6\" (1.676 m)   Wt 149 lb 3.2 oz (67.7 kg)   LMP 08/10/2018   BMI 24.08 kg/m²   Body mass index is 24.08 kg/m².   GENERAL: well developed, well nourished,in no apparent distress  HEENT: atraumatic, normocephalic, TMs normal  EYES:PERRLA, EOMI,conjunctiva are clear  NECK: supple,no adenopathy, no thyromegaly  BREAST: BREASTS:  Breasts are symmetrical.    Right breast: The skin, nipple ,and areola appear normal. No nipple inversion.  No nipple discharge. Tissue is mildly nodular. There are no dominant masses.  No palpable abnormality in the axilla.   Left breast:   The skin, nipple ,and areola appear normal. No nipple inversion.  No nipple discharge. Tissue is mildly nodular. There are no dominant masses.  No palpable abnormality in the axilla.   LUNGS: clear to auscultation  CARDIO: RRR without murmur  GI: good BS's,no masses, HSM or tenderness  :/  EXTREMITIES: no edema    ASSESSMENT AND PLAN:   Viviana Monzon is a 59 year old female who presents for a complete physical exam.  Encounter Diagnoses   Name Primary?    Routine general medical examination at a health care facility Yes    Visit for screening mammogram      Pap:  N/a  Mammogram:  yearly.    Dexascan:  /.  Labs:  Reviewed.  Check yearly.  Monitor lipids, heart scan in next 1-2 years  Colonoscopy:  Due 2026  Vaccines:  /  Recommended low fat diet and regular exercise.    The patient is asked to return  for CPX in 1 year.      2. Visit for screening mammogram  - [facility] 2D+3D SCREENING BILAT (CPT=77067/58303); Future      No orders of the defined types were placed in this encounter.      Meds & Refills for this Visit:  Requested Prescriptions      No prescriptions requested or ordered in this encounter       Imaging & Consults:  [facility] 2D+3D SCREENING BILAT (CPT=77067/01139)

## 2025-07-22 ENCOUNTER — HOSPITAL ENCOUNTER (OUTPATIENT)
Dept: MAMMOGRAPHY | Facility: HOSPITAL | Age: 60
Discharge: HOME | End: 2025-07-22
Attending: FAMILY MEDICINE
Payer: COMMERCIAL

## 2025-07-22 DIAGNOSIS — Z12.31 VISIT FOR SCREENING MAMMOGRAM: ICD-10-CM

## 2025-07-22 PROCEDURE — 77063 BREAST TOMOSYNTHESIS BI: CPT | Performed by: FAMILY MEDICINE

## 2025-07-22 PROCEDURE — 77067 SCR MAMMO BI INCL CAD: CPT | Performed by: FAMILY MEDICINE

## 2025-08-04 ENCOUNTER — TELEPHONE (OUTPATIENT)
Dept: NEUROLOGY | Facility: CLINIC | Age: 60
End: 2025-08-04

## 2025-08-04 RX ORDER — UBROGEPANT 100 MG/1
100 TABLET ORAL SEE ADMIN INSTRUCTIONS
COMMUNITY

## (undated) DIAGNOSIS — Z12.31 SCREENING MAMMOGRAM FOR BREAST CANCER: Primary | ICD-10-CM